# Patient Record
Sex: FEMALE | Race: WHITE | Employment: FULL TIME | URBAN - METROPOLITAN AREA
[De-identification: names, ages, dates, MRNs, and addresses within clinical notes are randomized per-mention and may not be internally consistent; named-entity substitution may affect disease eponyms.]

---

## 2019-02-14 LAB
ALBUMIN SERPL-MCNC: NORMAL G/DL
ALP BLD-CCNC: NORMAL U/L
ALT SERPL-CCNC: NORMAL U/L
ANION GAP SERPL CALCULATED.3IONS-SCNC: NORMAL MMOL/L
AST SERPL-CCNC: NORMAL U/L
BILIRUB SERPL-MCNC: NORMAL MG/DL
BUN BLDV-MCNC: NORMAL MG/DL
CALCIUM SERPL-MCNC: NORMAL MG/DL
CHLORIDE BLD-SCNC: NORMAL MMOL/L
CHOLESTEROL, TOTAL: NORMAL
CHOLESTEROL/HDL RATIO: NORMAL
CO2: NORMAL
CREAT SERPL-MCNC: NORMAL MG/DL
GFR CALCULATED: NORMAL
GLUCOSE BLD-MCNC: NORMAL MG/DL
HDLC SERPL-MCNC: NORMAL MG/DL
LDL CHOLESTEROL CALCULATED: NORMAL
POTASSIUM SERPL-SCNC: NORMAL MMOL/L
SODIUM BLD-SCNC: NORMAL MMOL/L
TOTAL PROTEIN: NORMAL
TRIGL SERPL-MCNC: NORMAL MG/DL
TSH SERPL DL<=0.05 MIU/L-ACNC: NORMAL M[IU]/L
VLDLC SERPL CALC-MCNC: NORMAL MG/DL

## 2019-07-08 DIAGNOSIS — Z12.11 SCREENING FOR MALIGNANT NEOPLASM OF COLON: Primary | ICD-10-CM

## 2019-07-08 LAB
CONTROL: NORMAL
HEMOCCULT STL QL: NORMAL

## 2019-07-08 PROCEDURE — 82274 ASSAY TEST FOR BLOOD FECAL: CPT | Performed by: FAMILY MEDICINE

## 2019-07-17 ENCOUNTER — OFFICE VISIT (OUTPATIENT)
Dept: PRIMARY CARE CLINIC | Age: 39
End: 2019-07-17
Payer: COMMERCIAL

## 2019-07-17 VITALS
DIASTOLIC BLOOD PRESSURE: 60 MMHG | HEART RATE: 67 BPM | HEIGHT: 62 IN | BODY MASS INDEX: 28.16 KG/M2 | WEIGHT: 153 LBS | OXYGEN SATURATION: 97 % | SYSTOLIC BLOOD PRESSURE: 122 MMHG

## 2019-07-17 DIAGNOSIS — Z00.00 HEALTH MAINTENANCE EXAMINATION: ICD-10-CM

## 2019-07-17 DIAGNOSIS — F41.9 ANXIETY: Primary | ICD-10-CM

## 2019-07-17 PROCEDURE — 99213 OFFICE O/P EST LOW 20 MIN: CPT | Performed by: FAMILY MEDICINE

## 2019-07-17 RX ORDER — SERTRALINE HYDROCHLORIDE 25 MG/1
TABLET, FILM COATED ORAL
Qty: 30 TABLET | Refills: 12 | Status: SHIPPED
Start: 2019-07-17 | End: 2020-04-06 | Stop reason: SDUPTHER

## 2019-07-17 RX ORDER — SERTRALINE HYDROCHLORIDE 25 MG/1
TABLET, FILM COATED ORAL
Refills: 3 | COMMUNITY
Start: 2019-07-03 | End: 2019-07-17 | Stop reason: SDUPTHER

## 2019-07-17 NOTE — ASSESSMENT & PLAN NOTE
Health maintenance issues discussed at length 3/19-encouraged yearly physicals and she is going to schedule up March 2020 for physical and yearly thereafter, sooner as needed.   Blood work before if insurance covers

## 2019-07-17 NOTE — PROGRESS NOTES
19  Coral Backer : 1980 Sex: female  Age: 34 y.o. Chief Complaint   Patient presents with    Other       HPI  HPI:    Patient presents today for follow-up on the Zoloft. Feeling very well. Tolerating it well. Less OCD and anxiety. 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 Review of Systems  ROS:  Const: Denies chills, fever, malaise and sweats. Eyes: Denies discharge, pain, redness and visual disturbance. ENMT: Denies earaches, other ear symptoms. Denies nasal or sinus symptoms other than stated  above. Denies mouth and tongue lesions and sore throat. CV: Denies chest discomfort, pain; diaphoresis, dizziness, edema, lightheadedness, orthopnea,  palpitations, syncope and near syncopal episode or any exertional symptoms  Resp: Denies cough, hemoptysis, pleuritic pain, SOB, sputum production and wheezing. GI: Denies abdominal pain, change in bowel habits, hematochezia, melena, nausea and vomiting. : Denies urinary symptoms including dysuria , urgency, frequency or hematuria. Musculo: Denies musculoskeletal symptoms. Skin: Denies bruising and rash. Neuro: Denies headache, numbness, stiff neck, tingling and focal weakness slurred speech or facial  droop  Hema/Lymph: Denies bleeding/bruising tendency and enlarged lymph nodes        Current Outpatient Medications:     sertraline (ZOLOFT) 25 MG tablet, TAKE ONE TABLET BY MOUTH EVERY DAY, Disp: 30 tablet, Rfl: 12  No Known Allergies    No past medical history on file. No past surgical history on file. No family history on file. Social History     Tobacco Use    Smoking status: Not on file   Substance Use Topics    Alcohol use: Not on file    Drug use: Not on file      Social History     Social History Narrative    PMH:    (Health Maintenance)    Medical Problems:    Denies    Surgical Hx:    Denies    Reviewed, no changes. FH:    Father:    . (Hx)    Mother:    .

## 2019-08-16 PROBLEM — Z00.00 HEALTH MAINTENANCE EXAMINATION: Status: RESOLVED | Noted: 2019-07-17 | Resolved: 2019-08-16

## 2020-03-11 LAB
ALBUMIN SERPL-MCNC: 4.6 G/DL
ALP BLD-CCNC: 75 U/L
ALT SERPL-CCNC: 13 U/L
ANION GAP SERPL CALCULATED.3IONS-SCNC: 2.2 MMOL/L
AST SERPL-CCNC: 12 U/L
BASOPHILS ABSOLUTE: 59 /ΜL
BASOPHILS RELATIVE PERCENT: 1 %
BILIRUB SERPL-MCNC: 0.5 MG/DL (ref 0.1–1.4)
BILIRUBIN, URINE: NEGATIVE
BLOOD, URINE: NEGATIVE
BUN BLDV-MCNC: 19 MG/DL
CALCIUM SERPL-MCNC: 9.4 MG/DL
CHLORIDE BLD-SCNC: 104 MMOL/L
CHOLESTEROL, TOTAL: 200 MG/DL
CHOLESTEROL/HDL RATIO: 5.1
CLARITY: ABNORMAL
CO2: 28 MMOL/L
COLOR: YELLOW
CREAT SERPL-MCNC: 0.96 MG/DL
EOSINOPHILS ABSOLUTE: 242 /ΜL
EOSINOPHILS RELATIVE PERCENT: 4.1 %
GFR CALCULATED: 73
GLUCOSE BLD-MCNC: 98 MG/DL
GLUCOSE URINE: ABNORMAL
HCT VFR BLD CALC: 42 % (ref 36–46)
HDLC SERPL-MCNC: 39 MG/DL (ref 35–70)
HEMOGLOBIN: 14 G/DL (ref 12–16)
KETONES, URINE: NEGATIVE
LDL CHOLESTEROL CALCULATED: 136 MG/DL (ref 0–160)
LEUKOCYTE ESTERASE, URINE: ABNORMAL
LYMPHOCYTES ABSOLUTE: 1540 /ΜL
LYMPHOCYTES RELATIVE PERCENT: 26.1 %
MCH RBC QN AUTO: 28.5 PG
MCHC RBC AUTO-ENTMCNC: 33.3 G/DL
MCV RBC AUTO: 85.4 FL
MONOCYTES ABSOLUTE: 419 /ΜL
MONOCYTES RELATIVE PERCENT: 7.1 %
NEUTROPHILS ABSOLUTE: 3640 /ΜL
NEUTROPHILS RELATIVE PERCENT: 61.7 %
NITRITE, URINE: NEGATIVE
PDW BLD-RTO: 13.1 %
PH UA: 5 (ref 4.5–8)
PLATELET # BLD: 214 K/ΜL
PMV BLD AUTO: 11.7 FL
POTASSIUM SERPL-SCNC: 4.2 MMOL/L
PROTEIN UA: NEGATIVE
RBC # BLD: 4.92 10^6/ΜL
SODIUM BLD-SCNC: 138 MMOL/L
SPECIFIC GRAVITY, URINE: 1.03
TOTAL PROTEIN: 6.7
TRIGL SERPL-MCNC: 129 MG/DL
TSH SERPL DL<=0.05 MIU/L-ACNC: 1.12 UIU/ML
UROBILINOGEN, URINE: ABNORMAL
VLDLC SERPL CALC-MCNC: NORMAL MG/DL
WBC # BLD: 5.9 10^3/ML

## 2020-04-06 RX ORDER — SERTRALINE HYDROCHLORIDE 25 MG/1
TABLET, FILM COATED ORAL
Qty: 90 TABLET | Refills: 0 | Status: SHIPPED
Start: 2020-04-06 | End: 2020-06-15 | Stop reason: SDUPTHER

## 2020-06-15 ENCOUNTER — OFFICE VISIT (OUTPATIENT)
Dept: PRIMARY CARE CLINIC | Age: 40
End: 2020-06-15
Payer: COMMERCIAL

## 2020-06-15 VITALS
SYSTOLIC BLOOD PRESSURE: 106 MMHG | OXYGEN SATURATION: 99 % | BODY MASS INDEX: 29.81 KG/M2 | DIASTOLIC BLOOD PRESSURE: 70 MMHG | TEMPERATURE: 98.6 F | RESPIRATION RATE: 18 BRPM | HEART RATE: 120 BPM | WEIGHT: 163 LBS

## 2020-06-15 PROBLEM — E78.2 MIXED HYPERLIPIDEMIA: Status: ACTIVE | Noted: 2020-06-15

## 2020-06-15 PROCEDURE — 93000 ELECTROCARDIOGRAM COMPLETE: CPT | Performed by: FAMILY MEDICINE

## 2020-06-15 PROCEDURE — 99213 OFFICE O/P EST LOW 20 MIN: CPT | Performed by: FAMILY MEDICINE

## 2020-06-15 PROCEDURE — 99396 PREV VISIT EST AGE 40-64: CPT | Performed by: FAMILY MEDICINE

## 2020-06-15 ASSESSMENT — PATIENT HEALTH QUESTIONNAIRE - PHQ9
SUM OF ALL RESPONSES TO PHQ QUESTIONS 1-9: 0
SUM OF ALL RESPONSES TO PHQ QUESTIONS 1-9: 0
2. FEELING DOWN, DEPRESSED OR HOPELESS: 0
1. LITTLE INTEREST OR PLEASURE IN DOING THINGS: 0
SUM OF ALL RESPONSES TO PHQ9 QUESTIONS 1 & 2: 0

## 2020-06-15 NOTE — PROGRESS NOTES
6/15/20  [facility] : 1980 Sex: female  Age: 37 y.o. Chief Complaint   Patient presents with    Annual Exam       HPI:   Patient presents today for physical and also has some increased anxiety over Covid-19 pandemic, would like her Zoloft increased.   Absolutely no SI/HI    Most Recent Labs  CBC  Lab Results   Component Value Date    WBC 5.9 03/10/2020    WBC 6.8 2013    WBC 6.4 2011    RBC 4.92 03/10/2020    RBC 4.72 2013    RBC 4.29 2011    HGB 14.0 03/10/2020    HGB 13.6 2013    HGB 12.6 2011    HCT 42.0 03/10/2020    HCT 40.8 2013    HCT 36.9 2011    MCV 85.4 03/10/2020    MCV 86.6 2013    MCV 86.0 2011     03/10/2020     2013     2011      CMP  Lab Results   Component Value Date     03/10/2020     2013     2011    K 4.2 03/10/2020    K 4.4 2013    K 4.2 2011     03/10/2020     2013     2011    CO2 28 03/10/2020    CO2 31 2013    CO2 30 2011    ANIONGAP 2.2 03/10/2020    GLUCOSE 98 03/10/2020    GLUCOSE 82 2013    GLUCOSE 84 2011    BUN 19 03/10/2020    BUN 14 2013    BUN 13 2011    CREATININE 0.96 03/10/2020    CREATININE 0.8 2013    CREATININE 0.8 2011    LABGLOM 73 03/10/2020    LABGLOM >60 2013    LABGLOM >60 2011    CALCIUM 9.4 03/10/2020    CALCIUM 9.2 2013    CALCIUM 9.1 2011    PROT 6.5 2013    PROT 6.9 2011    LABALBU 4.6 03/10/2020    LABALBU 4.3 2013    LABALBU 4.3 2011    BILITOT 0.5 03/10/2020    BILITOT 0.3 2013    BILITOT 0.5 2011    ALKPHOS 75 03/10/2020    ALKPHOS 69 2013    ALKPHOS 122 2011    AST 12 03/10/2020    AST 18 2013    AST 17 2011    ALT 13 03/10/2020    ALT 14 2013    ALT 18 2011     A1C  No results found for: LABA1C  TSH  Lab Results   Component Value Date    TSH hematuria. Musculo: Denies musculoskeletal symptoms. Skin: Denies bruising and rash. Neuro: Denies headache, numbness, stiff neck, tingling and focal weakness slurred speech or facial  droop  Hema/Lymph: Denies bleeding/bruising tendency and enlarged lymph nodes. Health Maintenance:  Proper diet reviewed including Mediterranean and DASH diets. Counseled on healthy weight, appropriate exercise, avoidance of tobacco, and recommendations for minimal to no alcohol consumption. Counseled on the potential pros and cons of vitamins and supplements. Reviewed the recommendations and risk/benefits of vaccines including Td/Tdap (declines),  pneumovax,  prevnar 13 , flu vaccine, Hepatitis vaccines, gardasil (delines), and shingrix (patient had chicken pox). HIV and Hep C (declines)  screening guidelines were reviewed. Importance of regular eye and dental exams and health reviewed. Risks/Benefits of ASA reviewed and discussed latest guidelines. Sun protection reviewed. Notify if any new or changing moles/skin lesions, etc. Dexa Scan indications/ risk factors for osteoporotic fractures and prevention reviewed. Colonoscopy recommendations reviewed. Pt denies change in bowel habits or [address] of colon polyps/CA. Fit test offered. EKG done and reviewed with pt. Discussed the indications for additional  cardiac testing including referrals, stress testing,  2d echo, ect. Not interested and ASX. Reviewed indications for other testing such as  PFT's.and  indications for imaging including brain, carotid, chest, abdominal, aortic . Pt is ASX and not interested at this time. Counseled on instructions regarding, and importance of monthly breast exams, yearly physician exams (sooner if sx's). Indications for mammograms reviewed and importance. Dexa Scan indications/ risk factors for osteoporotic fractures (and associated M/M) and preventative measures reviewed.  Importance of regular GYN exams reviewed and pt to follow

## 2020-07-06 ENCOUNTER — VIRTUAL VISIT (OUTPATIENT)
Dept: PRIMARY CARE CLINIC | Age: 40
End: 2020-07-06
Payer: COMMERCIAL

## 2020-07-06 PROCEDURE — 99213 OFFICE O/P EST LOW 20 MIN: CPT | Performed by: FAMILY MEDICINE

## 2020-07-06 NOTE — PROGRESS NOTES
sinus symptoms other than stated  above. Denies mouth and tongue lesions and sore throat. CV: Denies chest discomfort, pain; diaphoresis, dizziness, edema, lightheadedness, orthopnea,  palpitations, syncope and near syncopal episode or any exertional symptoms  Resp: Denies cough, hemoptysis, pleuritic pain, SOB, sputum production and wheezing. GI: Denies abdominal pain, change in bowel habits, hematochezia, melena, nausea and vomiting. : Denies urinary symptoms including dysuria , urgency, frequency or hematuria. Musculo: Denies musculoskeletal symptoms. Skin: Denies bruising and rash. Neuro: Denies headache, numbness, stiff neck, tingling and focal weakness slurred speech or facial  droop  Hema/Lymph: Denies bleeding/bruising tendency and enlarged lymph nodes         Current Outpatient Medications:     sertraline (ZOLOFT) 50 MG tablet, Take 1 tablet by mouth daily, Disp: 90 tablet, Rfl: 3  No Known Allergies    No past medical history on file. No past surgical history on file. Family History   Problem Relation Age of Onset    Coronary Art Dis Father 54    Heart Surgery Father 54    Cancer Father         lung    Colon Polyps Father         early 68's    Breast Cancer Maternal Aunt     Breast Cancer Maternal Grandmother      Social History     Tobacco Use    Smoking status: Never Smoker    Smokeless tobacco: Never Used   Substance Use Topics    Alcohol use: Not on file    Drug use: Not on file     Social History     Social History Narrative    PMH:    (Health Maintenance)    Medical Problems:    Denies    Surgical Hx:    Denies    Reviewed, no changes. FH:    Father:    . (Hx)    Mother:    . (Hx)    Brother 1:    . (Hx)    Sister 1:    . (Hx)    Dad - CAD/CABG 54, Lung CA (smoker), Colon polyp early 66's    Mom - healthy    Maternal grandmother and maternal aunt breast ca (pt doesn't know if braca tested)    Reviewed, no changes.         SH:    . 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 . (Alcohol). (Drug Use)                 PHYSICAL EXAMINATION:  [ INSTRUCTIONS:  \"[x]\" Indicates a positive item  \"[]\" Indicates a negative item  -- DELETE ALL ITEMS NOT EXAMINED]  Vital Signs: (As obtained by patient/caregiver or practitioner observation)    There were no vitals filed for this visit. Blood pressure-  Heart rate-    Respiratory rate-    Temperature-  Pulse oximetry-     Constitutional: [x] Appears well-developed and well-nourished [x] No apparent distress      [] Abnormal-   Mental status  [x] Alert and awake  [x] Oriented to person/place/time [x]Able to follow commands      Eyes:  EOM    [x]  Normal  [] Abnormal-  Sclera  [x]  Normal  [] Abnormal -         Discharge [x]  None visible  [] Abnormal -    HENT:   [x] Normocephalic, atraumatic. [] Abnormal   [x] Mouth/Throat: Mucous membranes are moist.     External Ears [x] Normal  [] Abnormal-     Neck: [x] No visualized mass     Pulmonary/Chest: [x] Respiratory effort normal.  [x] No visualized signs of difficulty breathing or respiratory distress        [] Abnormal-      Musculoskeletal:   [x] Normal gait with no signs of ataxia         [x] Normal range of motion of neck        [] Abnormal-       Neurological:        [x] No Facial Asymmetry (Cranial nerve 7 motor function) (limited exam to video visit)          [x] No gaze palsy        [] Abnormal-         Skin:        [x] No significant exanthematous lesions or discoloration noted on facial skin         [] Abnormal-            Psychiatric:       [x] Normal Affect [x] No Hallucinations        [] Abnormal-     Other pertinent observable physical exam findings-     ASSESSMENT/PLAN:   Diagnosis Orders   1. Anxiety     2. Mixed hyperlipidemia     3.  Health maintenance examination         No problem-specific Assessment & Plan notes found for this encounter. Anxiety  Counseled. Tolerating Zoloft and doing better on the 50 mg dosing, increased mid June 2020. Continue current dose and simply wants to follow-up in 1 year for physical sooner as needed. Certainly call if any issues. Absolutely no SI/HI. Appropriate counseling recommended. Risk benefits ADRs reviewed     Annual physical exam  Health maintenance issues discussed at length 6/2020 Encouraged yearly physicals.        Mixed hyperlipidemia  Counseled. Lifestyle modification emphasized. Weight watchers reviewed. Weight loss reviewed. Not interested in pharmacotherapy. Simply wants yearly check sooner as needed    Counseled extensively and differential diagnoses of above were reviewed, including serious etiologies. Side effects and interactions of medications were reviewed. Plan as above:       As long as symptoms steadily improve/resolve and medical conditions are following the expected course, FU as below, sooner PRN      Return keep physical, sooner prn. Time spent: Greater than Not billed by time      Saad Diehl is a 37 y.o. female being evaluated by a Virtual Visit (video visit) encounter to address concerns as mentioned above. A caregiver was present when appropriate. Due to this being a TeleHealth encounter (During [facility]27 public health emergency), evaluation of the following organ systems was limited: Vitals/Constitutional/EENT/Resp/CV/GI//MS/Neuro/Skin/Heme-Lymph-Imm. Pursuant to the emergency declaration under the [address] [place] [address], [address] [place] [address] authority and the [employer] and [employer] General Act, this Virtual Visit was conducted with patient's (and/or legal guardian's) consent, to reduce the patient's risk of exposure to COVID-19 and provide necessary medical care.   The patient (and/or legal guardian) has also been advised to contact this office for worsening conditions or problems, and seek emergency medical treatment and/or call 911 if deemed necessary. Services were provided through a video synchronous discussion virtually to substitute for in-person clinic visit. Patients are advised to check with insurance company to ensure coverage and to fully understand benefits and cost prior to any testing to try to avoid unexpected charges. This note was created with the assistance of voice recognition software. Inadvertent errors may be present. Signs and symptoms to watch for were discussed. Serious signs and symptoms reviewed. ER if any    --Vito Flannery MD on 7/6/2020 at 9:32 AM    An electronic signature was used to authenticate this note.

## 2020-07-15 PROBLEM — Z00.00 ANNUAL PHYSICAL EXAM: Status: RESOLVED | Noted: 2019-07-17 | Resolved: 2020-07-15

## 2021-05-17 DIAGNOSIS — F41.9 ANXIETY: ICD-10-CM

## 2021-06-04 DIAGNOSIS — Z00.00 ANNUAL PHYSICAL EXAM: ICD-10-CM

## 2021-06-04 LAB
ALBUMIN SERPL-MCNC: 4.3 G/DL
ALP BLD-CCNC: 70 U/L
ALT SERPL-CCNC: 11 U/L
ANION GAP SERPL CALCULATED.3IONS-SCNC: NORMAL MMOL/L
AST SERPL-CCNC: 13 U/L
BASOPHILS ABSOLUTE: 79 /ΜL
BASOPHILS RELATIVE PERCENT: 1.2 %
BILIRUB SERPL-MCNC: 0.4 MG/DL (ref 0.1–1.4)
BILIRUBIN, URINE: NEGATIVE
BLOOD, URINE: NEGATIVE
BUN BLDV-MCNC: 14 MG/DL
CALCIUM SERPL-MCNC: 9.1 MG/DL
CHLORIDE BLD-SCNC: 102 MMOL/L
CHOLESTEROL, TOTAL: 176 MG/DL
CHOLESTEROL/HDL RATIO: 4.6
CLARITY: ABNORMAL
CO2: 26 MMOL/L
COLOR: YELLOW
CREAT SERPL-MCNC: 0.85 MG/DL
EOSINOPHILS ABSOLUTE: 323 /ΜL
EOSINOPHILS RELATIVE PERCENT: 4.9 %
GFR CALCULATED: 85
GLUCOSE BLD-MCNC: 91 MG/DL
GLUCOSE URINE: NEGATIVE
HCT VFR BLD CALC: 41.2 % (ref 36–46)
HDLC SERPL-MCNC: 38 MG/DL (ref 35–70)
HEMOGLOBIN: 13.9 G/DL (ref 12–16)
KETONES, URINE: NEGATIVE
LDL CHOLESTEROL CALCULATED: 115 MG/DL (ref 0–160)
LEUKOCYTE ESTERASE, URINE: NEGATIVE
LYMPHOCYTES ABSOLUTE: 140 /ΜL
LYMPHOCYTES RELATIVE PERCENT: 29.4 %
MCH RBC QN AUTO: 29.2 PG
MCHC RBC AUTO-ENTMCNC: 33.7 G/DL
MCV RBC AUTO: 86.6 FL
MONOCYTES ABSOLUTE: 422 /ΜL
MONOCYTES RELATIVE PERCENT: 6.4 %
NEUTROPHILS ABSOLUTE: 3835 /ΜL
NEUTROPHILS RELATIVE PERCENT: 58.1 %
NITRITE, URINE: NEGATIVE
NONHDLC SERPL-MCNC: 138 MG/DL
PDW BLD-RTO: 12.3 %
PH UA: 5 (ref 4.5–8)
PLATELET # BLD: 220 K/ΜL
PMV BLD AUTO: 11.6 FL
POTASSIUM SERPL-SCNC: 3.9 MMOL/L
PROTEIN UA: NEGATIVE
RBC # BLD: 4.76 10^6/ΜL
SODIUM BLD-SCNC: 137 MMOL/L
SPECIFIC GRAVITY, URINE: 1.02
TOTAL PROTEIN: 6.3
TRIGL SERPL-MCNC: 119 MG/DL
TSH SERPL DL<=0.05 MIU/L-ACNC: 0.74 UIU/ML
UROBILINOGEN, URINE: ABNORMAL
VLDLC SERPL CALC-MCNC: NORMAL MG/DL
WBC # BLD: 6.6 10^3/ML

## 2021-06-16 ENCOUNTER — OFFICE VISIT (OUTPATIENT)
Dept: PRIMARY CARE CLINIC | Age: 41
End: 2021-06-16
Payer: COMMERCIAL

## 2021-06-16 VITALS
BODY MASS INDEX: 28.89 KG/M2 | TEMPERATURE: 98.3 F | RESPIRATION RATE: 16 BRPM | OXYGEN SATURATION: 98 % | HEART RATE: 78 BPM | WEIGHT: 157 LBS | HEIGHT: 62 IN | SYSTOLIC BLOOD PRESSURE: 118 MMHG | DIASTOLIC BLOOD PRESSURE: 72 MMHG

## 2021-06-16 DIAGNOSIS — Z83.71 FAMILY HISTORY OF COLONIC POLYPS: ICD-10-CM

## 2021-06-16 DIAGNOSIS — Z00.00 HEALTH MAINTENANCE EXAMINATION: Primary | ICD-10-CM

## 2021-06-16 DIAGNOSIS — F41.9 ANXIETY: ICD-10-CM

## 2021-06-16 PROBLEM — Z83.719 FAMILY HISTORY OF COLONIC POLYPS: Status: ACTIVE | Noted: 2021-06-16

## 2021-06-16 PROBLEM — Z80.0 FAMILY HISTORY OF COLON CANCER: Status: ACTIVE | Noted: 2021-06-16

## 2021-06-16 PROCEDURE — 99396 PREV VISIT EST AGE 40-64: CPT | Performed by: FAMILY MEDICINE

## 2021-06-16 ASSESSMENT — PATIENT HEALTH QUESTIONNAIRE - PHQ9
1. LITTLE INTEREST OR PLEASURE IN DOING THINGS: 0
2. FEELING DOWN, DEPRESSED OR HOPELESS: 0
SUM OF ALL RESPONSES TO PHQ QUESTIONS 1-9: 0
SUM OF ALL RESPONSES TO PHQ9 QUESTIONS 1 & 2: 0

## 2021-06-16 NOTE — PROGRESS NOTES
LABGLOM 73 03/10/2020    LABGLOM >60 11/13/2013    CALCIUM 9.1 06/03/2021    CALCIUM 9.4 03/10/2020    CALCIUM 9.2 11/13/2013    PROT 6.5 11/13/2013    PROT 6.9 11/28/2011    LABALBU 4.3 06/03/2021    LABALBU 4.6 03/10/2020    LABALBU 4.3 11/13/2013    LABALBU 4.3 11/28/2011    BILITOT 0.4 06/03/2021    BILITOT 0.5 03/10/2020    BILITOT 0.3 11/13/2013    ALKPHOS 70 06/03/2021    ALKPHOS 75 03/10/2020    ALKPHOS 69 11/13/2013    AST 13 06/03/2021    AST 12 03/10/2020    AST 18 11/13/2013    ALT 11 06/03/2021    ALT 13 03/10/2020    ALT 14 11/13/2013     A1C  No results found for: LABA1C  TSH  Lab Results   Component Value Date    TSH 0.74 06/03/2021    TSH 1.12 03/10/2020    TSH 1.038 11/13/2013     FREET4  No results found for: R4PNTYQ  LIPID  Lab Results   Component Value Date    CHOL 176 06/03/2021    CHOL 200 03/10/2020    CHOL 140 11/13/2013    CHOL 177 11/28/2011    HDL 38 06/03/2021    HDL 39 03/10/2020    HDL 50.0 11/13/2013    LDLCALC 115 06/03/2021    LDLCALC 136 03/10/2020    LDLCALC 79 11/13/2013    TRIG 119 06/03/2021    TRIG 129 03/10/2020    TRIG 56 11/13/2013    CHOLHDLRATIO 4.6 06/03/2021    CHOLHDLRATIO 5.1 03/10/2020     VITAMIN D  Lab Results   Component Value Date    VITD25 26 11/28/2011     MAGNESIUM  No results found for: MG   PHOS  No results found for: PHOS   DEDRICK   No results found for: DEDRICK  RHEUMATOID FACTOR  No results found for: RF  PSA  No results found for: PSA   HEPATITIS C  No results found for: HCVABI  HIV  No results found for: FWN8SSU, HIV1QT  UA  Lab Results   Component Value Date    COLORU Yellow 06/03/2021    COLORU Yellow 03/10/2020    CLARITYU Cloudy 06/03/2021    CLARITYU Cloudy 03/10/2020    GLUCOSEU Negative 06/03/2021    GLUCOSEU neg 03/10/2020    BILIRUBINUR Negative 06/03/2021    BILIRUBINUR Negative 03/10/2020    KETUA Negative 06/03/2021    KETUA Negative 03/10/2020    BLOODU Negative 06/03/2021    BLOODU Negative 03/10/2020    PHUR 5.0 06/03/2021    PHUR 5.0 03/10/2020 PROTEINU Negative 06/03/2021    PROTEINU Negative 03/10/2020    NITRU Negative 06/03/2021    NITRU Negative 03/10/2020    LEUKOCYTESUR Negative 06/03/2021    LEUKOCYTESUR 1+ 03/10/2020     Urine Micro/Albumin Ratio  No results found for: MALBCR              Review of Systems  ROS:  Const: Denies chills, fever, malaise and sweats. Eyes: Denies discharge, pain, redness and visual disturbance. ENMT: Denies earaches, other ear symptoms. Denies nasal or sinus symptoms other than stated  above. Denies mouth and tongue lesions and sore throat. CV: Denies chest discomfort, pain; diaphoresis, dizziness, edema, lightheadedness, orthopnea,  palpitations, syncope and near syncopal episode or any exertional symptoms  Resp: Denies cough, hemoptysis, pleuritic pain, SOB, sputum production and wheezing. GI: Denies abdominal pain, change in bowel habits, hematochezia, melena, nausea and vomiting. : Denies urinary symptoms including dysuria , urgency, frequency or hematuria. Musculo: Denies musculoskeletal symptoms. Skin: Denies bruising and rash. Neuro: Denies headache, numbness, stiff neck, tingling and focal weakness slurred speech or facial  droop  Hema/Lymph: Denies bleeding/bruising tendency and enlarged lymph nodes. Health Maintenance:  Proper diet reviewed including Mediterranean and DASH diets. Counseled on healthy weight, appropriate exercise, avoidance of tobacco, and recommendations for minimal to no alcohol consumption. Counseled on the potential pros and cons of vitamins and supplements. Reviewed the recommendations and risk/benefits of vaccines including COVID vaccine,  Td/Tdap (declines),  pneumovax,  prevnar 13 , flu vaccine, Hepatitis vaccines, gardasil (delines), and shingrix (patient had chicken pox). HIV and Hep C (declines)  screening guidelines were reviewed. Importance of regular eye and dental exams and health reviewed. Risks/Benefits of ASA reviewed and discussed latest guidelines.  Rudy Baer protection reviewed. Notify if any new or changing moles/skin lesions, etc. Dexa Scan indications/ risk factors for osteoporotic fractures and prevention reviewed. Colonoscopy recommendations reviewed. Pt denies change in bowel habits or [address] of colon CA. Dad had polyps, his dr recommended kids be screened after 36. Fit test provided. Otherwise defers till 37     Past EKG  reviewed with pt. Discussed the indications for repeat ekg and for  additional  cardiac testing including referrals, stress testing,  2d echo, ect. dasx Reviewed indications for other testing such as  PFT's.and  indications for imaging including brain, carotid, chest, abdominal, aortic .  dasx    Counseled on instructions regarding, and importance of monthly breast exams, yearly physician exams (sooner if sx's). Indications for mammograms reviewed and importance. Dexa Scan indications/ risk factors for osteoporotic fractures (and associated M/M) and preventative measures reviewed. Importance of regular GYN exams reviewed and pt to follow here or with her gynecologist as directed. Gets mammo through gyn in dr [place] [facility]uff        Current Outpatient Medications:     sertraline (ZOLOFT) 50 MG tablet, Take 1 tablet by mouth daily, Disp: 90 tablet, Rfl: 3  No Known Allergies    No past medical history on file. No past surgical history on file.   Family History   Problem Relation Age of Onset    Coronary Art Dis Father 54    Heart Surgery Father 54    Cancer Father         lung    Colon Polyps Father         early 68's    Breast Cancer Maternal Aunt     Breast Cancer Maternal Grandmother      Social History     Tobacco Use    Smoking status: Never Smoker    Smokeless tobacco: Never Used   Vaping Use    Vaping Use: Never used   Substance Use Topics    Alcohol use: Not on file    Drug use: Not on file      Social History     Social History Narrative    PMH:    (Health Maintenance)    Medical Problems:    Denies    Surgical Hx: Denies    Reviewed, no changes. FH:    Father:    . (Hx)    Mother:    . (Hx)    Brother 1:    . (Hx)    Sister 1:    . (Hx)    Dad - CAD/CABG 54, Lung CA (smoker), Colon polyp early 66's    Mom - healthy    Maternal grandmother and maternal aunt breast ca (pt doesn't know if braca tested)    Reviewed, no changes. SH:    . ([address]    , 2 kids (ages 8 girl and 7 boy as of 2019 shared custody)    [employer] shoots    Personal Habits: Cigarette Use: Nonsmoker. . (Alcohol). (Drug Use)        Vitals:    06/16/21 0929   BP: 118/72   Pulse: 78   Resp: 16   Temp: 98.3 °F (36.8 °C)   SpO2: 98%   Weight: 157 lb (71.2 kg)   Height: 5' 2\" (1.575 m)       Physical Exam  Exam:  Const: Appears comfortable. No signs of acute distress present. Head/Face: Atraumatic on inspection. Eyes: EOMI in both eyes. No discharge from the eyes. PERRL. Sclerae clear. ENMT: Auditory canals normal. Tympanic membranes: intact and translucent. External nose WNL. Nasal mucosa is clear. Oropharynx: No erythema or exudate. Posterior pharynx is normal.  Neck: Supple. Palpation reveals no adenopathy. No masses appreciated. No JVD. Carotids: no  bruits. Resp: Respirations are unlabored. Clear to auscultation. No rales, rhonchi or wheezes appreciated  over the lungs bilaterally. CV: Rate is regular. Rhythm is regular. No gallop or rubs. No heart murmur appreciated. Extremities: No clubbing, cyanosis, or edema. No calf inflammation or tenderness. Abdomen: Bowel sounds are normoactive. Abdomen is soft, nontender, and nondistended. No  abdominal masses. No palpable hepatosplenomegaly. Lymph: No palpable or visible regional lymphadenopathy. Musculoskeletal: no acute joint inflammation. Skin: Dry and warm with no rash. Skin normal to inspection and palpation overall. Neuro: Alert and oriented. Affect: appropriate. Upper Extremities: 5/5 bilaterally. Lower Extremities:  5/5 bilaterally.  Sensation intact to light touch. Reflexes: DTR's are symmetric and 2+ bilaterally. .  Cranial Nerves: Cranial nerves grossly intact. Assessment and Plan:    Diagnosis Orders   1. Health maintenance examination  Lipid Panel    TSH without Reflex    Comprehensive Metabolic Panel    CBC Auto Differential    Urinalysis   2. Anxiety  sertraline (ZOLOFT) 50 MG tablet   3. Family history of colonic polyps  POCT Fecal Immunochemical Test (FIT)        Anxiety  Counseled. Tolerating Zoloft , working well. Would like rf. Absolutely no SI/HI. Appropriate counseling recommended. Risk benefits ADRs reviewed    Annual physical exam  Health maintenance issues discussed at length as above, 6/21. Encouraged yearly physicals. Mixed hyperlipidemia  Counseled. Lifestyle modification reviewed. Improved. Role of pharmacotherapy reviewed. Simply wants yearly check sooner as needed     Family history of colonic polyps  Dad's doctor recommended kids getting screened starting age 36, pt defers to 37 but willing to have fit test.          Plan as above. Counseled extensively and differential diagnoses relevant to above were reviewed, including serious etiologies. Side effects and interactions of medications were reviewed. Counseled. Fit test. Rf. bw and fu 1yr, sooner prn. As long as symptoms steadily improve/resolve, and medical conditions follow the expected course, FU as below, sooner PRN. Return in about 1 year (around 6/16/2022) for physical.        Signs and symptoms to watch for discussed, serious signs and symptoms reviewed. ER if any. Daisy Crocker MD    Patients are advised to check with insurance company to ensure coverage and to fully understand benefits and cost prior to any testing. This note was created with the assistance of voice recognition software. Document was reviewed however may contain grammatical errors.

## 2021-06-16 NOTE — ASSESSMENT & PLAN NOTE
Dad's doctor recommended kids getting screened starting age 36, pt defers to 37 but willing to have fit test.

## 2021-06-30 DIAGNOSIS — Z83.71 FAMILY HISTORY OF COLONIC POLYPS: ICD-10-CM

## 2021-06-30 LAB
CONTROL: NORMAL
HEMOCCULT STL QL: NEGATIVE

## 2021-06-30 PROCEDURE — 82274 ASSAY TEST FOR BLOOD FECAL: CPT | Performed by: FAMILY MEDICINE

## 2022-06-13 LAB
ALBUMIN SERPL-MCNC: 4.4 G/DL
ALP BLD-CCNC: NORMAL U/L
ALT SERPL-CCNC: 11 U/L
ANION GAP SERPL CALCULATED.3IONS-SCNC: NORMAL MMOL/L
AST SERPL-CCNC: 10 U/L
BASOPHILS ABSOLUTE: 81 /ΜL
BASOPHILS RELATIVE PERCENT: 1.4 %
BILIRUB SERPL-MCNC: 0.9 MG/DL (ref 0.1–1.4)
BILIRUBIN, URINE: NEGATIVE
BLOOD, URINE: NEGATIVE
BUN BLDV-MCNC: 19 MG/DL
CALCIUM SERPL-MCNC: 9.1 MG/DL
CHLORIDE BLD-SCNC: 103 MMOL/L
CHOLESTEROL, TOTAL: 196 MG/DL
CHOLESTEROL/HDL RATIO: 4.1
CLARITY: ABNORMAL
CO2: 26 MMOL/L
COLOR: YELLOW
CREAT SERPL-MCNC: 0.88 MG/DL
EOSINOPHILS ABSOLUTE: 220 /ΜL
EOSINOPHILS RELATIVE PERCENT: 3.8 %
GFR CALCULATED: NORMAL
GLUCOSE BLD-MCNC: 87 MG/DL
GLUCOSE URINE: NEGATIVE
HCT VFR BLD CALC: 39.4 % (ref 36–46)
HDLC SERPL-MCNC: 48 MG/DL (ref 35–70)
HEMOGLOBIN: 13.5 G/DL (ref 12–16)
KETONES, URINE: NEGATIVE
LDL CHOLESTEROL CALCULATED: 123 MG/DL (ref 0–160)
LEUKOCYTE ESTERASE, URINE: ABNORMAL
LYMPHOCYTES ABSOLUTE: 1473 /ΜL
LYMPHOCYTES RELATIVE PERCENT: 25.4 %
MCH RBC QN AUTO: 29.9 PG
MCHC RBC AUTO-ENTMCNC: 34.9 G/DL
MCV RBC AUTO: 87.4 FL
MONOCYTES ABSOLUTE: 412 /ΜL
MONOCYTES RELATIVE PERCENT: 7.1 %
NEUTROPHILS ABSOLUTE: 3613 /ΜL
NEUTROPHILS RELATIVE PERCENT: 62.3 %
NITRITE, URINE: NEGATIVE
NONHDLC SERPL-MCNC: 148 MG/DL
PDW BLD-RTO: 12.9 %
PH UA: 5 (ref 4.5–8)
PLATELET # BLD: 194 K/ΜL
PMV BLD AUTO: 11 FL
POTASSIUM SERPL-SCNC: 4 MMOL/L
PROTEIN UA: NEGATIVE
RBC # BLD: 4.51 10^6/ΜL
SODIUM BLD-SCNC: 136 MMOL/L
SPECIFIC GRAVITY, URINE: 1.02
TOTAL PROTEIN: 6.5
TRIGL SERPL-MCNC: 140 MG/DL
TSH SERPL DL<=0.05 MIU/L-ACNC: 1.37 UIU/ML
UROBILINOGEN, URINE: NORMAL
VLDLC SERPL CALC-MCNC: NORMAL MG/DL
WBC # BLD: 5.8 10^3/ML

## 2022-06-14 DIAGNOSIS — Z00.00 HEALTH MAINTENANCE EXAMINATION: ICD-10-CM

## 2022-06-16 ENCOUNTER — OFFICE VISIT (OUTPATIENT)
Dept: PRIMARY CARE CLINIC | Age: 42
End: 2022-06-16
Payer: COMMERCIAL

## 2022-06-16 VITALS
HEIGHT: 62 IN | HEART RATE: 81 BPM | SYSTOLIC BLOOD PRESSURE: 122 MMHG | BODY MASS INDEX: 29.44 KG/M2 | OXYGEN SATURATION: 98 % | TEMPERATURE: 98 F | DIASTOLIC BLOOD PRESSURE: 76 MMHG | WEIGHT: 160 LBS

## 2022-06-16 DIAGNOSIS — Z83.71 FAMILY HISTORY OF COLONIC POLYPS: ICD-10-CM

## 2022-06-16 DIAGNOSIS — E66.3 OVERWEIGHT: ICD-10-CM

## 2022-06-16 DIAGNOSIS — Z00.00 ANNUAL PHYSICAL EXAM: Primary | ICD-10-CM

## 2022-06-16 DIAGNOSIS — F41.9 ANXIETY: ICD-10-CM

## 2022-06-16 PROCEDURE — 90471 IMMUNIZATION ADMIN: CPT | Performed by: FAMILY MEDICINE

## 2022-06-16 PROCEDURE — 99396 PREV VISIT EST AGE 40-64: CPT | Performed by: FAMILY MEDICINE

## 2022-06-16 PROCEDURE — 90715 TDAP VACCINE 7 YRS/> IM: CPT | Performed by: FAMILY MEDICINE

## 2022-06-16 PROCEDURE — 93000 ELECTROCARDIOGRAM COMPLETE: CPT | Performed by: FAMILY MEDICINE

## 2022-06-16 ASSESSMENT — PATIENT HEALTH QUESTIONNAIRE - PHQ9
SUM OF ALL RESPONSES TO PHQ QUESTIONS 1-9: 0
1. LITTLE INTEREST OR PLEASURE IN DOING THINGS: 0
2. FEELING DOWN, DEPRESSED OR HOPELESS: 0
SUM OF ALL RESPONSES TO PHQ9 QUESTIONS 1 & 2: 0
SUM OF ALL RESPONSES TO PHQ QUESTIONS 1-9: 0

## 2022-06-16 NOTE — PROGRESS NOTES
Yu Merida : 1980 Sex: female  Age: 35 y.o. Chief Complaint   Patient presents with    Annual Exam       HPI:   Overall feeling very well. Dad with CAD and colon polyps. His dr recommended kids be screened starting age 36, she is asx, would like fit test. Defers scope now or otherwise. Defers till 37.           The 10-year ASCVD risk score (Jaelyn Daigle, et al., 2013) is: 0.8%    Values used to calculate the score:      Age: 35 years      Sex: Female      Is Non- : No      Diabetic: No      Tobacco smoker: No      Systolic Blood Pressure: 224 mmHg      Is BP treated: No      HDL Cholesterol: 48 mg/dL      Total Cholesterol: 196 mg/dL      BW reviewed, HDL 48,       Most Recent Labs  CBC  Lab Results   Component Value Date    WBC 5.8 2022    WBC 6.6 2021    WBC 5.9 03/10/2020    RBC 4.51 2022    RBC 4.76 2021    RBC 4.92 03/10/2020    HGB 13.5 2022    HGB 13.9 2021    HGB 14.0 03/10/2020    HCT 39.4 2022    HCT 41.2 2021    HCT 42.0 03/10/2020    MCV 87.4 2022    MCV 86.6 2021    MCV 85.4 03/10/2020     2022     2021     03/10/2020      CMP  Lab Results   Component Value Date     2022     2021     03/10/2020    K 4.0 2022    K 3.9 2021    K 4.2 03/10/2020     2022     2021     03/10/2020    CO2 26 2022    CO2 26 2021    CO2 28 03/10/2020    ANIONGAP 2.2 03/10/2020    GLUCOSE 87 2022    GLUCOSE 91 2021    GLUCOSE 98 03/10/2020    GLUCOSE 84 2011    BUN 19 2022    BUN 14 2021    BUN 19 03/10/2020    CREATININE 0.88 2022    CREATININE 0.85 2021    CREATININE 0.96 03/10/2020    LABGLOM 85 2021    LABGLOM 73 03/10/2020    LABGLOM >60 2013    CALCIUM 9.1 2022    CALCIUM 9.1 2021    CALCIUM 9.4 03/10/2020    PROT 6.5 2013 PROT 6.9 11/28/2011    LABALBU 4.4 06/13/2022    LABALBU 4.3 06/03/2021    LABALBU 4.6 03/10/2020    LABALBU 4.3 11/28/2011    BILITOT 0.9 06/13/2022    BILITOT 0.4 06/03/2021    BILITOT 0.5 03/10/2020    ALKPHOS 70 06/03/2021    ALKPHOS 75 03/10/2020    ALKPHOS 69 11/13/2013    AST 10 06/13/2022    AST 13 06/03/2021    AST 12 03/10/2020    ALT 11 06/13/2022    ALT 11 06/03/2021    ALT 13 03/10/2020     A1C  No results found for: LABA1C  TSH  Lab Results   Component Value Date    TSH 1.37 06/13/2022    TSH 0.74 06/03/2021    TSH 1.12 03/10/2020     FREET4  No results found for: C8VPCRC  LIPID  Lab Results   Component Value Date    CHOL 196 06/13/2022    CHOL 176 06/03/2021    CHOL 200 03/10/2020    HDL 48 06/13/2022    HDL 38 06/03/2021    HDL 39 03/10/2020    LDLCALC 123 06/13/2022    LDLCALC 115 06/03/2021    LDLCALC 136 03/10/2020    TRIG 140 06/13/2022    TRIG 119 06/03/2021    TRIG 129 03/10/2020    CHOLHDLRATIO 4.1 06/13/2022    CHOLHDLRATIO 4.6 06/03/2021    CHOLHDLRATIO 5.1 03/10/2020     VITAMIN D  Lab Results   Component Value Date    VITD25 26 11/28/2011     MAGNESIUM  No results found for: MG   PHOS  No results found for: PHOS   DEDRICK   No results found for: DEDRICK  RHEUMATOID FACTOR  No results found for: RF  PSA  No results found for: PSA   HEPATITIS C  No results found for: HCVABI  HIV  No results found for: WNG1HDI, HIV1QT  UA  Lab Results   Component Value Date    COLORU Yellow 06/13/2022    COLORU Yellow 06/03/2021    COLORU Yellow 03/10/2020    CLARITYU Cloudy 06/13/2022    CLARITYU Cloudy 06/03/2021    CLARITYU Cloudy 03/10/2020    GLUCOSEU negative 06/13/2022    GLUCOSEU Negative 06/03/2021    GLUCOSEU neg 03/10/2020    BILIRUBINUR Negative 06/13/2022    BILIRUBINUR Negative 06/03/2021    BILIRUBINUR Negative 03/10/2020    KETUA Negative 06/13/2022    KETUA Negative 06/03/2021    KETUA Negative 03/10/2020    BLOODU Negative 06/13/2022    BLOODU Negative 06/03/2021    BLOODU Negative 03/10/2020 PHUR 5.0 06/13/2022    PHUR 5.0 06/03/2021    PHUR 5.0 03/10/2020    PROTEINU Negative 06/13/2022    PROTEINU Negative 06/03/2021    PROTEINU Negative 03/10/2020    UROBILINOGEN Normal 06/13/2022    NITRU Negative 06/13/2022    NITRU Negative 06/03/2021    NITRU Negative 03/10/2020    LEUKOCYTESUR 1+ 06/13/2022    LEUKOCYTESUR Negative 06/03/2021    LEUKOCYTESUR 1+ 03/10/2020     Urine Micro/Albumin Ratio  No results found for: MALBCR              Review of Systems  ROS:  Const: Denies chills, fever, malaise and sweats. Eyes: Denies discharge, pain, redness and visual disturbance. ENMT: Denies earaches, other ear symptoms. Denies nasal or sinus symptoms other than stated  above. Denies mouth and tongue lesions and sore throat. CV: Denies chest discomfort, pain; diaphoresis, dizziness, edema, lightheadedness, orthopnea,  palpitations, syncope and near syncopal episode or any exertional symptoms  Resp: Denies cough, hemoptysis, pleuritic pain, SOB, sputum production and wheezing. GI: Denies abdominal pain, change in bowel habits, hematochezia, melena, nausea and vomiting. : Denies urinary symptoms including dysuria , urgency, frequency or hematuria. Musculo: Denies musculoskeletal symptoms. Skin: Denies bruising and rash. Neuro: Denies headache, numbness, stiff neck, tingling and focal weakness slurred speech or facial  droop  Hema/Lymph: Denies bleeding/bruising tendency and enlarged lymph nodes. Health Maintenance:  Proper diet reviewed including Mediterranean and DASH diets. Counseled on healthy weight, appropriate exercise, avoidance of tobacco, and recommendations for minimal to no alcohol consumption. Counseled on the potential pros and cons of vitamins and supplements. Reviewed the recommendations and risk/benefits of vaccines including COVID vaccine, declines, had covid 11/21.   Td/Tdap (agrees),  pneumovax,  prevnar 13 , flu vaccine, Hepatitis vaccines, gardasil (declines), and shingrix (patient had chicken pox). HIV and Hep C (declines)  screening guidelines were reviewed. Importance of regular eye and dental exams and health reviewed. Risks/Benefits of ASA reviewed and discussed latest guidelines. Sun protection reviewed. Notify if any new or changing moles/skin lesions, etc. Dexa Scan indications/ risk factors for osteoporotic fractures and prevention reviewed. Colonoscopy recommendations reviewed. Pt denies change in bowel habits or [address] of colon CA. Dad had polyps, his dr recommended kids be screened after 36. Fit test provided. Otherwise defers till 43     EKG done and  reviewed with pt. Discussed the indications for repeat ekg and for  additional  cardiac testing including referrals, stress testing,  2d echo, ect. dasx Reviewed indications for other testing such as  PFT's.and  indications for imaging including brain, carotid, chest, abdominal, aortic .  dasx    Counseled on instructions regarding, and importance of monthly breast exams, yearly physician exams (sooner if sx's). Indications for mammograms reviewed and importance. Dexa Scan indications/ risk factors for osteoporotic fractures (and associated M/M) and preventative measures reviewed. Importance of regular GYN exams reviewed and pt to follow here or with her gynecologist as directed. Gets mammo through gyn in dr [place] [facility]      Current Outpatient Medications:     sertraline (ZOLOFT) 50 MG tablet, Take 1 tablet by mouth daily, Disp: 90 tablet, Rfl: 3  No Known Allergies    No past medical history on file. No past surgical history on file.   Family History   Problem Relation Age of Onset    Coronary Art Dis Father 54    Heart Surgery Father 54    Cancer Father         lung    Colon Polyps Father         early 68's    Breast Cancer Maternal Aunt     Breast Cancer Maternal Grandmother      Social History     Tobacco Use    Smoking status: Never Smoker    Smokeless tobacco: Never Used   Vaping Use    Vaping Use: Never used   Substance Use Topics    Alcohol use: Not on file    Drug use: Not on file      Social History     Social History Narrative    PMH:    (Health Maintenance)    Medical Problems:    Denies    Surgical Hx:    Denies    Reviewed, no changes. FH:    Father:    . (Hx)    Mother:    . (Hx)    Brother 1:    . (Hx)    Sister 1:    . (Hx)    Dad - CAD/CABG 54, Lung CA (smoker), Colon polyp early 66's    Mom - healthy    Maternal grandmother and maternal aunt breast ca (pt doesn't know if braca tested)    Reviewed, no changes. SH:    . ([address]    , 2 kids (ages 8 girl and 7 boy as of 2019 shared custody)    Boyfriend (carla [employer]) 1 hour [place], in town every other week    [employer] shoots    Personal Habits: Cigarette Use: Nonsmoker. . (Alcohol). (Drug Use)        Vitals:    06/16/22 0906   BP: 122/76   Pulse: 81   Temp: 98 °F (36.7 °C)   SpO2: 98%   Weight: 160 lb (72.6 kg)   Height: 5' 2\" (1.575 m)       Physical Exam  Exam:  Const: Appears comfortable. No signs of acute distress present. Head/Face: Atraumatic on inspection. Eyes: EOMI in both eyes. No discharge from the eyes. PERRL. Sclerae clear. ENMT: Auditory canals normal. Tympanic membranes: intact and translucent. External nose WNL. Nasal mucosa is clear. Oropharynx: No erythema or exudate. Posterior pharynx is normal.  Neck: Supple. Palpation reveals no adenopathy. No masses appreciated. No JVD. Carotids: no  bruits. Resp: Respirations are unlabored. Clear to auscultation. No rales, rhonchi or wheezes appreciated  over the lungs bilaterally. CV: Rate is regular. Rhythm is regular. No gallop or rubs. No heart murmur appreciated. Extremities: No clubbing, cyanosis, or edema. No calf inflammation or tenderness. Abdomen: Bowel sounds are normoactive. Abdomen is soft, nontender, and nondistended. No  abdominal masses. No palpable hepatosplenomegaly.   Lymph: No palpable or visible regional lymphadenopathy. Musculoskeletal: no acute joint inflammation. Skin: Dry and warm with no rash. Skin normal to inspection and palpation overall. Neuro: Alert and oriented. Affect: appropriate. Upper Extremities: 5/5 bilaterally. Lower Extremities:  5/5 bilaterally. Sensation intact to light touch. Reflexes: DTR's are symmetric and 2+ bilaterally. .  Cranial Nerves: Cranial nerves grossly intact. Assessment and Plan:    Diagnosis Orders   1. Annual physical exam  EKG 12 lead    EKG 12 lead    Tdap, ADACEL, (age 10y-63y), IM    Lipid Panel    TSH    Comprehensive Metabolic Panel    CBC with Auto Differential    Urinalysis   2. Anxiety  sertraline (ZOLOFT) 50 MG tablet   3. Family history of colonic polyps  POCT Fecal Immunochemical Test (FIT)   4. Overweight          Anxiety  Counseled. Tolerating Zoloft , working well. Would like rf. Absolutely no SI/HI. Appropriate counseling recommended. Risk benefits ADRs reviewed    Annual physical exam  Health maintenance issues discussed at length as above, 6/16/22. Encouraged yearly physicals. Mixed hyperlipidemia  Counseled. Lifestyle modification reviewed. LDL up but HDL up   Role of pharmacotherapy reviewed. Defers Simply wants yearly check sooner as needed     Family history of colonic polyps  Dad's doctor recommended kids getting screened starting age 36, pt defers to 37 but willing to have fit test.      Overweight  Counseled, approp diet/exercise reviewed, small frequent meals, wt watchers. Counseled on dieticians. Counseled on wegovy. Plan as above. Counseled extensively and differential diagnoses relevant to above were reviewed, including serious etiologies, risks and complications, especially of left uncontrolled. If relevant, instructions and  alternatives to meds/treatment reviewed, as well as interactions, and  SE's/ADRs reviewed, notify immediately if any, discontinuing new meds if any.   Plan made after discussion and shared decision

## 2022-10-03 LAB — MAMMOGRAPHY, EXTERNAL: NORMAL

## 2023-06-19 ENCOUNTER — OFFICE VISIT (OUTPATIENT)
Dept: PRIMARY CARE CLINIC | Age: 43
End: 2023-06-19
Payer: COMMERCIAL

## 2023-06-19 VITALS
SYSTOLIC BLOOD PRESSURE: 122 MMHG | WEIGHT: 173 LBS | OXYGEN SATURATION: 97 % | TEMPERATURE: 97.3 F | DIASTOLIC BLOOD PRESSURE: 60 MMHG | HEART RATE: 84 BPM | BODY MASS INDEX: 31.64 KG/M2

## 2023-06-19 DIAGNOSIS — F41.9 ANXIETY: ICD-10-CM

## 2023-06-19 DIAGNOSIS — Z00.00 ANNUAL PHYSICAL EXAM: Primary | ICD-10-CM

## 2023-06-19 DIAGNOSIS — Z12.11 COLON CANCER SCREENING: ICD-10-CM

## 2023-06-19 DIAGNOSIS — Z83.71 FAMILY HISTORY OF COLONIC POLYPS: ICD-10-CM

## 2023-06-19 DIAGNOSIS — E66.9 OBESITY (BMI 30-39.9): ICD-10-CM

## 2023-06-19 PROCEDURE — 99396 PREV VISIT EST AGE 40-64: CPT | Performed by: FAMILY MEDICINE

## 2023-06-19 PROCEDURE — 93000 ELECTROCARDIOGRAM COMPLETE: CPT | Performed by: FAMILY MEDICINE

## 2023-06-19 SDOH — ECONOMIC STABILITY: FOOD INSECURITY: WITHIN THE PAST 12 MONTHS, THE FOOD YOU BOUGHT JUST DIDN'T LAST AND YOU DIDN'T HAVE MONEY TO GET MORE.: NEVER TRUE

## 2023-06-19 SDOH — ECONOMIC STABILITY: FOOD INSECURITY: WITHIN THE PAST 12 MONTHS, YOU WORRIED THAT YOUR FOOD WOULD RUN OUT BEFORE YOU GOT MONEY TO BUY MORE.: NEVER TRUE

## 2023-06-19 SDOH — ECONOMIC STABILITY: HOUSING INSECURITY
IN THE LAST 12 MONTHS, WAS THERE A TIME WHEN YOU DID NOT HAVE A STEADY PLACE TO SLEEP OR SLEPT IN A SHELTER (INCLUDING NOW)?: NO

## 2023-06-19 SDOH — ECONOMIC STABILITY: INCOME INSECURITY: HOW HARD IS IT FOR YOU TO PAY FOR THE VERY BASICS LIKE FOOD, HOUSING, MEDICAL CARE, AND HEATING?: NOT HARD AT ALL

## 2023-06-19 NOTE — PROGRESS NOTES
Vani Goddard : 1980 Sex: female  Age: 38 y.o. Chief Complaint   Patient presents with    Annual Exam    Health Maintenance    Discuss Labs       HPI:   Overall feeling very well.            The 10-year ASCVD risk score (Cielo ROSE, et al., 2019) is: 0.9%    Values used to calculate the score:      Age: 38 years      Sex: Female      Is Non- : No      Diabetic: No      Tobacco smoker: No      Systolic Blood Pressure: 960 mmHg      Is BP treated: No      HDL Cholesterol: 41 mg/dL      Total Cholesterol: 172 mg/dL      BW reviewed, protein 6.0 CMP otherwise normal HDL 41  triglyceride 112 TSH normal CBC with differential normal    Most Recent Labs  CBC  Lab Results   Component Value Date/Time    WBC 6.1 2023 12:00 AM    WBC 5.8 2022 12:00 AM    WBC 6.6 2021 12:00 AM    RBC 4.41 2023 12:00 AM    RBC 4.51 2022 12:00 AM    RBC 4.76 2021 12:00 AM    HGB 12.9 2023 12:00 AM    HGB 13.5 2022 12:00 AM    HGB 13.9 2021 12:00 AM    HCT 37.7 2023 12:00 AM    HCT 39.4 2022 12:00 AM    HCT 41.2 2021 12:00 AM    MCV 85.5 2023 12:00 AM    MCV 87.4 2022 12:00 AM    MCV 86.6 2021 12:00 AM     2023 12:00 AM     2022 12:00 AM     2021 12:00 AM      CMP  Lab Results   Component Value Date/Time     2023 12:00 AM     2022 12:00 AM     2021 12:00 AM    K 4.2 2023 12:00 AM    K 4.0 2022 12:00 AM    K 3.9 2021 12:00 AM     2023 12:00 AM     2022 12:00 AM     2021 12:00 AM    CO2 24 2023 12:00 AM    CO2 26 2022 12:00 AM    CO2 26 2021 12:00 AM    ANIONGAP 2.2 03/10/2020 12:00 AM    GLUCOSE 96 2023 12:00 AM    GLUCOSE 87 2022 12:00 AM    GLUCOSE 91 2021 12:00 AM    GLUCOSE 84 2011 01:50 PM    BUN 16 2023 12:00 AM    BUN 19 2022 12:00

## 2023-06-26 DIAGNOSIS — F41.9 ANXIETY: ICD-10-CM

## 2023-07-03 DIAGNOSIS — Z12.11 COLON CANCER SCREENING: ICD-10-CM

## 2023-07-03 LAB
CONTROL: NORMAL
HEMOCCULT STL QL: NEGATIVE

## 2023-07-03 PROCEDURE — 82274 ASSAY TEST FOR BLOOD FECAL: CPT | Performed by: FAMILY MEDICINE

## 2023-11-15 LAB — MAMMOGRAPHY, EXTERNAL: NORMAL

## 2024-01-04 DIAGNOSIS — F41.9 ANXIETY: ICD-10-CM

## 2024-06-14 LAB
ALBUMIN SERPL-MCNC: 4.3 G/DL
ALP BLD-CCNC: 80 U/L
ALT SERPL-CCNC: 13 U/L
ANION GAP SERPL CALCULATED.3IONS-SCNC: ABNORMAL MMOL/L
AST SERPL-CCNC: 11 U/L
BASOPHILS ABSOLUTE: 69 /ΜL
BASOPHILS RELATIVE PERCENT: 0.9 %
BILIRUB SERPL-MCNC: 0.4 MG/DL (ref 0.1–1.4)
BILIRUBIN, URINE: NEGATIVE
BLOOD, URINE: NEGATIVE
BUN BLDV-MCNC: 16 MG/DL
CALCIUM SERPL-MCNC: 9.4 MG/DL
CHLORIDE BLD-SCNC: 103 MMOL/L
CHOLESTEROL, TOTAL: 219 MG/DL
CHOLESTEROL/HDL RATIO: 5.1
CLARITY: ABNORMAL
CO2: 25 MMOL/L
COLOR: YELLOW
CREAT SERPL-MCNC: 0.83 MG/DL
EGFR: 89
EOSINOPHILS ABSOLUTE: 300 /ΜL
EOSINOPHILS RELATIVE PERCENT: 3.9 %
GLUCOSE BLD-MCNC: 103 MG/DL
GLUCOSE URINE: NEGATIVE
HCT VFR BLD CALC: 41.7 % (ref 36–46)
HDLC SERPL-MCNC: 43 MG/DL (ref 35–70)
HEMOGLOBIN: 14 G/DL (ref 12–16)
KETONES, URINE: NEGATIVE
LDL CHOLESTEROL CALCULATED: 140 MG/DL (ref 0–160)
LEUKOCYTE ESTERASE, URINE: NEGATIVE
LYMPHOCYTES ABSOLUTE: 1725 /ΜL
LYMPHOCYTES RELATIVE PERCENT: 22.4 %
MCH RBC QN AUTO: 28.8 PG
MCHC RBC AUTO-ENTMCNC: 33.6 G/DL
MCV RBC AUTO: 85.8 FL
MONOCYTES ABSOLUTE: 462 /ΜL
MONOCYTES RELATIVE PERCENT: 6 %
NEUTROPHILS ABSOLUTE: 5144 /ΜL
NEUTROPHILS RELATIVE PERCENT: 66.8 %
NITRITE, URINE: NEGATIVE
NONHDLC SERPL-MCNC: 176 MG/DL
PDW BLD-RTO: 12.6 %
PH UA: 5.5 (ref 4.5–8)
PLATELET # BLD: 274 K/ΜL
PMV BLD AUTO: 11.4 FL
POTASSIUM SERPL-SCNC: 4.2 MMOL/L
PROTEIN UA: NEGATIVE
RBC # BLD: 4.86 10^6/ΜL
SODIUM BLD-SCNC: 138 MMOL/L
SPECIFIC GRAVITY, URINE: 1.02
TOTAL PROTEIN: 6.7
TRIGL SERPL-MCNC: 216 MG/DL
TSH SERPL DL<=0.05 MIU/L-ACNC: 1.1 UIU/ML
UROBILINOGEN, URINE: NORMAL
VLDLC SERPL CALC-MCNC: ABNORMAL MG/DL
WBC # BLD: 7.7 10^3/ML

## 2024-06-21 ASSESSMENT — PATIENT HEALTH QUESTIONNAIRE - PHQ9
SUM OF ALL RESPONSES TO PHQ QUESTIONS 1-9: 0
SUM OF ALL RESPONSES TO PHQ9 QUESTIONS 1 & 2: 0
SUM OF ALL RESPONSES TO PHQ QUESTIONS 1-9: 0
1. LITTLE INTEREST OR PLEASURE IN DOING THINGS: NOT AT ALL
2. FEELING DOWN, DEPRESSED OR HOPELESS: NOT AT ALL
2. FEELING DOWN, DEPRESSED OR HOPELESS: NOT AT ALL
SUM OF ALL RESPONSES TO PHQ9 QUESTIONS 1 & 2: 0
SUM OF ALL RESPONSES TO PHQ QUESTIONS 1-9: 0
SUM OF ALL RESPONSES TO PHQ QUESTIONS 1-9: 0
1. LITTLE INTEREST OR PLEASURE IN DOING THINGS: NOT AT ALL

## 2024-06-24 ENCOUNTER — OFFICE VISIT (OUTPATIENT)
Dept: PRIMARY CARE CLINIC | Age: 44
End: 2024-06-24
Payer: COMMERCIAL

## 2024-06-24 VITALS
WEIGHT: 186 LBS | HEART RATE: 95 BPM | SYSTOLIC BLOOD PRESSURE: 126 MMHG | BODY MASS INDEX: 34.23 KG/M2 | DIASTOLIC BLOOD PRESSURE: 68 MMHG | OXYGEN SATURATION: 96 % | TEMPERATURE: 98.1 F | HEIGHT: 62 IN

## 2024-06-24 DIAGNOSIS — Z12.11 COLON CANCER SCREENING: ICD-10-CM

## 2024-06-24 DIAGNOSIS — R73.9 HYPERGLYCEMIA: ICD-10-CM

## 2024-06-24 DIAGNOSIS — F41.9 ANXIETY: ICD-10-CM

## 2024-06-24 DIAGNOSIS — Z83.719 FAMILY HISTORY OF COLONIC POLYPS: ICD-10-CM

## 2024-06-24 DIAGNOSIS — E78.2 MIXED HYPERLIPIDEMIA: ICD-10-CM

## 2024-06-24 DIAGNOSIS — Z00.00 ANNUAL PHYSICAL EXAM: ICD-10-CM

## 2024-06-24 DIAGNOSIS — Z00.00 ANNUAL PHYSICAL EXAM: Primary | ICD-10-CM

## 2024-06-24 PROBLEM — Z80.0 FAMILY HISTORY OF COLON CANCER: Status: RESOLVED | Noted: 2021-06-16 | Resolved: 2024-06-24

## 2024-06-24 PROCEDURE — 93000 ELECTROCARDIOGRAM COMPLETE: CPT | Performed by: FAMILY MEDICINE

## 2024-06-24 PROCEDURE — 99396 PREV VISIT EST AGE 40-64: CPT | Performed by: FAMILY MEDICINE

## 2024-06-24 PROCEDURE — 99214 OFFICE O/P EST MOD 30 MIN: CPT | Performed by: FAMILY MEDICINE

## 2024-06-24 RX ORDER — METFORMIN HYDROCHLORIDE 500 MG/1
500 TABLET, EXTENDED RELEASE ORAL
Qty: 30 TABLET | Refills: 3 | Status: SHIPPED | OUTPATIENT
Start: 2024-06-24

## 2024-06-24 SDOH — ECONOMIC STABILITY: FOOD INSECURITY: WITHIN THE PAST 12 MONTHS, YOU WORRIED THAT YOUR FOOD WOULD RUN OUT BEFORE YOU GOT MONEY TO BUY MORE.: NEVER TRUE

## 2024-06-24 SDOH — ECONOMIC STABILITY: FOOD INSECURITY: WITHIN THE PAST 12 MONTHS, THE FOOD YOU BOUGHT JUST DIDN'T LAST AND YOU DIDN'T HAVE MONEY TO GET MORE.: NEVER TRUE

## 2024-06-24 SDOH — ECONOMIC STABILITY: INCOME INSECURITY: HOW HARD IS IT FOR YOU TO PAY FOR THE VERY BASICS LIKE FOOD, HOUSING, MEDICAL CARE, AND HEATING?: NOT HARD AT ALL

## 2024-06-24 NOTE — PROGRESS NOTES
(5' 2\")       Physical Exam  Exam:  Const: Appears comfortable. No signs of acute distress present.  Head/Face: Atraumatic on inspection.  Eyes: EOMI in both eyes. No discharge from the eyes. PERRL. Sclerae clear.  ENMT: Auditory canals normal. Tympanic membranes: intact and translucent. External nose WNL.  Nasal mucosa is clear. Oropharynx: No erythema or exudate. Posterior pharynx is normal.  Neck: Supple. Palpation reveals no adenopathy. No masses appreciated. No JVD. Carotids: no  bruits.  Resp: Respirations are unlabored. Clear to auscultation. No rales, rhonchi or wheezes appreciated  over the lungs bilaterally.  CV: Rate is regular. Rhythm is regular. No gallop or rubs. No heart murmur appreciated.  Extremities: No clubbing, cyanosis, or edema. No calf inflammation or tenderness.  Abdomen: Bowel sounds are normoactive. Abdomen is soft, nontender, and nondistended. No  abdominal masses. No palpable hepatosplenomegaly.  Lymph: No palpable or visible regional lymphadenopathy.  Musculoskeletal: no acute joint inflammation.  Skin: Dry and warm with no rash. Skin normal to inspection and palpation overall.  Neuro: Alert and oriented. Affect: appropriate. Upper Extremities: 5/5 bilaterally. Lower Extremities:  5/5 bilaterally. Sensation intact to light touch. Reflexes: DTR's are symmetric and 2+ bilaterally. .  Cranial Nerves: Cranial nerves grossly intact.      Assessment and Plan:    Diagnosis Orders   1. Annual physical exam  EKG 12 Lead      2. Hyperglycemia  Hemoglobin A1C    metFORMIN (GLUCOPHAGE-XR) 500 MG extended release tablet      3. Colon cancer screening  Ambulatory referral to General Surgery    POCT Fecal Immunochemical Test (FIT)      4. Mixed hyperlipidemia  Comprehensive Metabolic Panel    CK    Lipid Panel      5. Family history of colonic polyps        6. Anxiety             Anxiety  Counseled.  Tolerating Zoloft , working well.   Absolutely no SI/HI.  Appropriate counseling recommended.  Risk

## 2024-08-20 ENCOUNTER — OFFICE VISIT (OUTPATIENT)
Dept: SURGERY | Age: 44
End: 2024-08-20

## 2024-08-20 VITALS
HEART RATE: 71 BPM | DIASTOLIC BLOOD PRESSURE: 65 MMHG | SYSTOLIC BLOOD PRESSURE: 112 MMHG | BODY MASS INDEX: 32.6 KG/M2 | TEMPERATURE: 97.1 F | OXYGEN SATURATION: 98 % | WEIGHT: 184 LBS | HEIGHT: 63 IN

## 2024-08-20 DIAGNOSIS — Z12.11 ENCOUNTER FOR SCREENING COLONOSCOPY: Primary | ICD-10-CM

## 2024-08-20 NOTE — PROGRESS NOTES
(smoker), Colon polyp early 70's    Mom - healthy    Maternal grandmother and maternal aunt breast ca (pt doesn't know if braca tested)    Reviewed, no changes.        SH:    . ([employer]    , 2 kids (ages 10 girl and 7 boy as of 2019 shared custody)    Boyfriend (carla and nathen norris) 1 hour [place], in [facility] every other week    [employer] shoots    Personal Habits: Cigarette Use: Nonsmoker.. (Alcohol). (Drug Use)     Social Determinants of Health     Financial Resource Strain: Low Risk  (6/24/2024)    Overall Financial Resource Strain (CARDIA)     Difficulty of Paying Living Expenses: Not hard at all   Food Insecurity: No Food Insecurity (6/24/2024)    Hunger Vital Sign     Worried About Running Out of Food in the Last Year: Never true     Ran Out of Food in the Last Year: Never true   Transportation Needs: Unknown (6/24/2024)    PRAPARE - Transportation     Lack of Transportation (Non-Medical): No   Housing Stability: Unknown (6/24/2024)    Housing Stability Vital Sign     Unstable Housing in the Last Year: No       Family History   Problem Relation Age of Onset    Coronary Art Dis Father 55    Heart Surgery Father 55    Cancer Father         lung    Colon Polyps Father         early 70's    Breast Cancer Maternal Aunt     Breast Cancer Maternal Grandmother        Review of Systems   All other systems reviewed and are negative.              Objective:  Vitals:    08/20/24 1253   BP: 112/65   Pulse: 71   Temp: 97.1 °F (36.2 °C)   SpO2: 98%   Weight: 83.5 kg (184 lb)   Height: 1.588 m (5' 2.5\")        Body mass index is 33.12 kg/m².      Physical Exam  HENT:      Head: Normocephalic and atraumatic.   Eyes:      General:         Right eye: No discharge.         Left eye: No discharge.   Neck:      Trachea: No tracheal deviation.   Cardiovascular:      Rate and Rhythm: Normal rate.   Pulmonary:      Effort: Pulmonary effort is normal. No respiratory distress.   Abdominal:

## 2024-08-27 ENCOUNTER — PREP FOR PROCEDURE (OUTPATIENT)
Dept: SURGERY | Age: 44
End: 2024-08-27

## 2024-08-27 DIAGNOSIS — Z12.11 COLON CANCER SCREENING: ICD-10-CM

## 2024-09-05 ENCOUNTER — PATIENT MESSAGE (OUTPATIENT)
Dept: PRIMARY CARE CLINIC | Age: 44
End: 2024-09-05

## 2024-09-20 LAB
ALBUMIN: 4.2 G/DL
ALP BLD-CCNC: 72 U/L
ALT SERPL-CCNC: 11 U/L
ANION GAP SERPL CALCULATED.3IONS-SCNC: ABNORMAL MMOL/L
AST SERPL-CCNC: 13 U/L
BILIRUB SERPL-MCNC: 0.3 MG/DL (ref 0.1–1.4)
BUN BLDV-MCNC: 13 MG/DL
CALCIUM SERPL-MCNC: 8.8 MG/DL
CHLORIDE BLD-SCNC: 106 MMOL/L
CHOLESTEROL, TOTAL: 189 MG/DL
CHOLESTEROL/HDL RATIO: 5.3
CO2: 21 MMOL/L
CREAT SERPL-MCNC: 0.79 MG/DL
GFR, ESTIMATED: 94
GLUCOSE BLD-MCNC: 104 MG/DL
HDLC SERPL-MCNC: 36 MG/DL (ref 35–70)
LDL CHOLESTEROL: 124
NONHDLC SERPL-MCNC: 153 MG/DL
POTASSIUM SERPL-SCNC: 4.4 MMOL/L
SODIUM BLD-SCNC: 137 MMOL/L
TOTAL CK: 44 U/L
TOTAL PROTEIN: 6.2 G/DL (ref 6.4–8.2)
TRIGL SERPL-MCNC: 177 MG/DL
VLDLC SERPL CALC-MCNC: ABNORMAL MG/DL

## 2024-09-23 DIAGNOSIS — E78.2 MIXED HYPERLIPIDEMIA: ICD-10-CM

## 2024-09-23 DIAGNOSIS — R73.9 HYPERGLYCEMIA: ICD-10-CM

## 2024-09-26 PROBLEM — Z12.11 COLON CANCER SCREENING: Status: RESOLVED | Noted: 2024-08-27 | Resolved: 2024-09-26

## 2024-09-30 ENCOUNTER — OFFICE VISIT (OUTPATIENT)
Dept: PRIMARY CARE CLINIC | Age: 44
End: 2024-09-30
Payer: COMMERCIAL

## 2024-09-30 VITALS
DIASTOLIC BLOOD PRESSURE: 60 MMHG | WEIGHT: 185 LBS | HEART RATE: 98 BPM | OXYGEN SATURATION: 97 % | TEMPERATURE: 97.7 F | BODY MASS INDEX: 33.3 KG/M2 | SYSTOLIC BLOOD PRESSURE: 122 MMHG

## 2024-09-30 DIAGNOSIS — E66.9 OBESITY (BMI 30-39.9): ICD-10-CM

## 2024-09-30 DIAGNOSIS — F41.9 ANXIETY: ICD-10-CM

## 2024-09-30 DIAGNOSIS — Z00.00 ANNUAL PHYSICAL EXAM: ICD-10-CM

## 2024-09-30 DIAGNOSIS — R73.9 HYPERGLYCEMIA: Primary | ICD-10-CM

## 2024-09-30 DIAGNOSIS — E78.2 MIXED HYPERLIPIDEMIA: ICD-10-CM

## 2024-09-30 LAB — HBA1C MFR BLD: 5.4 %

## 2024-09-30 PROCEDURE — 99214 OFFICE O/P EST MOD 30 MIN: CPT | Performed by: FAMILY MEDICINE

## 2024-09-30 PROCEDURE — 83036 HEMOGLOBIN GLYCOSYLATED A1C: CPT | Performed by: FAMILY MEDICINE

## 2024-12-02 ENCOUNTER — TELEPHONE (OUTPATIENT)
Dept: SURGERY | Age: 44
End: 2024-12-02

## 2024-12-02 NOTE — TELEPHONE ENCOUNTER
Prior Authorization Form:      DEMOGRAPHICS:                     Patient Name:  Isamar Reno  Patient :  1980            Insurance:  Payor: [employer] [place] / Plan: [employer] PPO [place] LOCAL / Product Type: *No Product type* /   Insurance ID Number:    Payer/Plan Subscr  Sex Relation Sub. Ins. ID Effective Group Num   1. BCBS [employer]* ISAMAR RENO 1980 Female Self [id number]                                   [id number]         DIAGNOSIS & PROCEDURE:                       Procedure/Operation: Colonoscopy           CPT Code: 52624    Diagnosis:  Screening    ICD10 Code: Z12.11    Location:  [facility]    Surgeon:  Dr. Shahid    SCHEDULING INFORMATION:                          Date: 2025    Time: 08:00am              Anesthesia:  LMAC                                                       Status:  Outpatient        Special Comments:         Electronically signed by Parris Amin MA on 2024 at 3:21 PM

## 2024-12-02 NOTE — TELEPHONE ENCOUNTER
Leah Reno is scheduled for Colonoscopy with Dr Shahid on 02/06/2025 at [facility]. Patient needs to be NPO after midnight the night before procedure. All surgery instructions were explained to the patient and a surgery letter was also mailed out. MA informed patient that PAT will also be calling to review pre-op instructions and medications. Patient verbalized understanding.

## 2025-01-06 DIAGNOSIS — F41.9 ANXIETY: ICD-10-CM

## 2025-01-06 NOTE — TELEPHONE ENCOUNTER
Name of Medication(s) Requested:  Requested Prescriptions     Pending Prescriptions Disp Refills    sertraline (ZOLOFT) 50 MG tablet 90 tablet 3     Sig: Take 1 tablet by mouth daily       Medication is on current medication list Yes    Dosage and directions were verified? Yes    Quantity verified: 90 day supply     Pharmacy Verified?  Yes    Last Appointment:  9/30/2024    Future appts:  Future Appointments   Date Time Provider Department Center   2/25/2025  3:45 PM Lyle Shahid MD [facility]   7/9/2025  1:00 PM Bonilla Dimas MD [facility]        (If no appt send self scheduling link. .REFILLAPPT)  Scheduling request sent?     [] Yes  [] No    Does patient need updated?  [] Yes  [] No

## 2025-01-31 PROBLEM — Z12.11 COLON CANCER SCREENING: Status: ACTIVE | Noted: 2024-08-27

## 2025-01-31 NOTE — PROGRESS NOTES
[facility] PRE-ADMISSION TESTING INSTRUCTIONS    The Preadmission Testing patient is instructed accordingly using the following criteria (check applicable):    ARRIVAL INSTRUCTIONS:  [x] Parking the day of Surgery is located in the Main Entrance lot.  Upon entering the door, make an immediate right to the surgery reception desk    [x] Bring photo ID and insurance card    [] Bring in a copy of Living will or Durable Power of  papers.    [x] Please be sure to arrange for a responsible adult to provide transportation to and from the hospital    [x] Please arrange for a responsible adult to be with you for the 24 hour period post procedure due to having anesthesia    [x] If you awake am of surgery not feeling well or have temperature >100 please call [phone]    GENERAL INSTRUCTIONS:    [x] , may have up to 8oz of water until 4 hours prior to surgery. No gum, no candy, no mints. NPO time:       [x] You may brush your teeth, do not swallow any toothpaste    [x] Take medications as instructed     [x] Stop herbal supplements and vitamins 5 days prior to procedure    [x] Follow preop dosing of blood thinners per physician instructions    [] Take 1/2 dose of evening insulin, but no insulin after midnight    [] No oral diabetic medications after midnight    [] If diabetic and have low blood sugar or feel symptomatic, take 1-2oz apple juice only    [] Bring inhalers day of surgery    [x] Bring urine specimen day of surgery    [x] Shower or bath with soap, lather and rinse well, AM of Surgery, no lotion, powders or creams to surgical site    [x] Follow bowel prep as instructed per surgeon    [x] No tobacco products within 24 hours of surgery     [] No alcohol or illegal drug use, marijuana included, within 24 hours of surgery.    [x] Jewelry, body piercing's, eyeglasses, contact lenses and dentures are not permitted into surgery (bring cases)      [x] Please do not wear any nail polish,

## 2025-02-04 ENCOUNTER — TELEPHONE (OUTPATIENT)
Dept: SURGERY | Age: 45
End: 2025-02-04

## 2025-02-04 NOTE — TELEPHONE ENCOUNTER
Patient called and needed to reschedule colonoscopy, MA rescheduled to 05-09-25 at [facility].  Electronically signed by Alea Morrell MA on 2/4/2025 at 6:00 AM

## 2025-03-02 PROBLEM — Z12.11 COLON CANCER SCREENING: Status: RESOLVED | Noted: 2024-08-27 | Resolved: 2025-03-02

## 2025-05-05 ENCOUNTER — ANESTHESIA EVENT (OUTPATIENT)
Dept: ENDOSCOPY | Age: 45
End: 2025-05-05
Payer: COMMERCIAL

## 2025-05-05 ASSESSMENT — LIFESTYLE VARIABLES: SMOKING_STATUS: 0

## 2025-05-05 NOTE — ANESTHESIA PRE PROCEDURE
Department of Anesthesiology  Preprocedure Note       Name:  Leah Reno   Age:  44 y.o.  :  1980                                          MRN:  [id number]         Date:  2025      Surgeon: Surgeon(s):  Lyle Shahid MD    Procedure: Procedure(s):  COLORECTAL CANCER SCREENING, NOT HIGH RISK    Medications prior to admission:   Prior to Admission medications    Medication Sig Start Date End Date Taking? Authorizing Provider   sertraline (ZOLOFT) 50 MG tablet Take 1 tablet by mouth daily 25   Bonilla Dimas MD       Current medications:    No current facility-administered medications for this encounter.     Current Outpatient Medications   Medication Sig Dispense Refill    sertraline (ZOLOFT) 50 MG tablet Take 1 tablet by mouth daily 90 tablet 3       Allergies:  No Known Allergies    Problem List:    Patient Active Problem List   Diagnosis Code    Anxiety F41.9    Mixed hyperlipidemia E78.2    Family history of colonic polyps Z83.719       Past Medical History:  History reviewed. No pertinent past medical history.    Past Surgical History:  History reviewed. No pertinent surgical history.    Social History:    Social History     Tobacco Use    Smoking status: Never    Smokeless tobacco: Never   Substance Use Topics    Alcohol use: Yes     Alcohol/week: 2.0 standard drinks of alcohol     Types: 2 Drinks containing 0.5 oz of alcohol per week     Comment: social                                Counseling given: Not Answered      Vital Signs (Current):   Vitals:    25 0902   Weight: 83.9 kg (185 lb)   Height: 1.588 m (5' 2.5\")                                              BP Readings from Last 3 Encounters:   24 122/60   24 112/65   24 126/68       NPO Status:                                                                                 BMI:   Wt Readings from Last 3 Encounters:   24 83.9 kg (185 lb)   24 83.5 kg (184 lb)   24 84.4 kg (186 lb)     Body mass

## 2025-05-07 RX ORDER — ACETAMINOPHEN 500 MG
500 TABLET ORAL EVERY 6 HOURS PRN
COMMUNITY

## 2025-05-07 RX ORDER — IBUPROFEN 200 MG
400 TABLET ORAL EVERY 6 HOURS PRN
COMMUNITY

## 2025-05-07 NOTE — PROGRESS NOTES
[facility] PRE-ADMISSION TESTING INSTRUCTIONS    The Preadmission Testing patient is instructed accordingly using the following criteria (check applicable):    ARRIVAL INSTRUCTIONS:  [x] Parking the day of Surgery is located in the [facility] or  Entrance A lot.  Upon entering the door, make an immediate right to the surgery reception desk    [x] Bring photo ID and insurance card    [] Bring in a copy of Living will or Durable Power of  papers.    [x] Please be sure to arrange for responsible adult to provide transportation to and from the hospital    [x] Please arrange for responsible adult to be with you for the 24 hour period post procedure due to having anesthesia    [x] If you awake am of surgery not feeling well or have temperature >100 please call [phone]    GENERAL INSTRUCTIONS:    [x] May have clear liquids until 4 hours prior to surgery. Examples include water, fruit juices (no pulp), jello, popsicles, black coffee or tea, beef or chicken broth.1200/0800 MAY HAVE 8 OZ OF WATER               No gum, candy or mints.    [x] You may brush your teeth, but do not swallow any water    [x] Take medications as instructed with 1-2 oz of water. TYLENOL IF NEEDED.    [] Stop herbal supplements and vitamins 5 days prior to procedure    [x] Follow preop dosing of blood thinners per physician instructions    [] Take 1/2 dose of evening insulin, but no insulin after midnight    [] No oral diabetic medications after midnight    [] If diabetic and have low blood sugar or feel symptomatic, take 1-2oz apple juice only    [] Bring inhalers day of surgery    [] Bring C-PAP/ Bi-Pap day of surgery    [x] Bring urine specimen day of surgery. bE ABLE TO URINATE    [x] Shower or bath with soap, lather and rinse well, AM of Surgery, no lotion, powders or creams to surgical site    [x] Follow bowel prep as instructed per surgeon    [x] No tobacco products within 24 hours of surgery     [x] No

## 2025-05-09 ENCOUNTER — HOSPITAL ENCOUNTER (OUTPATIENT)
Age: 45
Setting detail: OUTPATIENT SURGERY
Discharge: HOME | End: 2025-05-09
Attending: SURGERY | Admitting: SURGERY
Payer: COMMERCIAL

## 2025-05-09 ENCOUNTER — ANESTHESIA (OUTPATIENT)
Dept: ENDOSCOPY | Age: 45
End: 2025-05-09
Payer: COMMERCIAL

## 2025-05-09 VITALS
TEMPERATURE: 97 F | DIASTOLIC BLOOD PRESSURE: 68 MMHG | WEIGHT: 185 LBS | HEIGHT: 62 IN | HEART RATE: 76 BPM | SYSTOLIC BLOOD PRESSURE: 116 MMHG | BODY MASS INDEX: 34.04 KG/M2 | OXYGEN SATURATION: 94 % | RESPIRATION RATE: 18 BRPM

## 2025-05-09 LAB
HCG, URINE, POC: NEGATIVE
Lab: NORMAL
NEGATIVE QC PASS/FAIL: NORMAL
POSITIVE QC PASS/FAIL: NORMAL

## 2025-05-09 PROCEDURE — 3700000001 HC ADD 15 MINUTES (ANESTHESIA): Performed by: SURGERY

## 2025-05-09 PROCEDURE — 2709999900 HC NON-CHARGEABLE SUPPLY: Performed by: SURGERY

## 2025-05-09 PROCEDURE — 3700000000 HC ANESTHESIA ATTENDED CARE: Performed by: SURGERY

## 2025-05-09 PROCEDURE — 3609027000 HC COLONOSCOPY: Performed by: SURGERY

## 2025-05-09 PROCEDURE — G0121 COLON CA SCRN NOT HI RSK IND: HCPCS | Performed by: SURGERY

## 2025-05-09 PROCEDURE — 7100000010 HC PHASE II RECOVERY - FIRST 15 MIN: Performed by: SURGERY

## 2025-05-09 PROCEDURE — 6360000002 HC RX W HCPCS: Performed by: NURSE ANESTHETIST, CERTIFIED REGISTERED

## 2025-05-09 PROCEDURE — 7100000011 HC PHASE II RECOVERY - ADDTL 15 MIN: Performed by: SURGERY

## 2025-05-09 RX ORDER — PROPOFOL 10 MG/ML
INJECTION, EMULSION INTRAVENOUS
Status: DISCONTINUED | OUTPATIENT
Start: 2025-05-09 | End: 2025-05-09 | Stop reason: SDUPTHER

## 2025-05-09 RX ADMIN — PROPOFOL 230 MG: 10 INJECTION, EMULSION INTRAVENOUS at 12:05

## 2025-05-09 ASSESSMENT — PAIN SCALES - GENERAL: PAINLEVEL_OUTOF10: 0

## 2025-05-09 ASSESSMENT — PAIN - FUNCTIONAL ASSESSMENT: PAIN_FUNCTIONAL_ASSESSMENT: NONE - DENIES PAIN

## 2025-05-09 NOTE — ANESTHESIA POSTPROCEDURE EVALUATION
Department of Anesthesiology  Postprocedure Note    Patient: Leah Reno  MRN: [id number]  YOB: 1980  Date of evaluation: 5/9/2025    Procedure Summary       Date: 05/09/25 Room / Location: [address]    Anesthesia Start: 1201 Anesthesia Stop: 1220    Procedure: COLORECTAL CANCER SCREENING, NOT HIGH RISK Diagnosis:       Colon cancer screening      (Colon cancer screening [Z12.11])    Surgeons: Lyle Shahid MD Responsible Provider: Frakn Henry DO    Anesthesia Type: MAC ASA Status: 2            Anesthesia Type: No value filed.    Kleber Phase I: Kleber Score: 10    Kleber Phase II: Kleber Score: 10    Anesthesia Post Evaluation    Level of consciousness: awake  Pain score: 1  Airway patency: patent  Nausea & Vomiting: no vomiting and no nausea  Cardiovascular status: hemodynamically stable  Respiratory status: acceptable  Hydration status: stable  Pain management: adequate    No notable events documented.

## 2025-05-09 NOTE — H&P
Denies     Reviewed, no changes.           FH:     Father:     . (Hx)     Mother:     . (Hx)     Brother 1:     . (Hx)     Sister 1:     . (Hx)     Dad - CAD/CABG 55, Lung CA (smoker), Colon polyp early 70's     Mom - healthy     Maternal grandmother and maternal aunt breast ca (pt doesn't know if braca tested)     Reviewed, no changes.           SH:     . (Marital)[employer]     , 2 kids (ages 10 girl and 7 boy as of 2019 shared custody)     Boyfriend (carla and nathen norris) [place], in [facility] every other week     [employer] shoots     Personal Habits: Cigarette Use: Nonsmoker.. (Alcohol). (Drug Use)      Social Determinants of Health           Financial Resource Strain: Low Risk  (6/24/2024)     Overall Financial Resource Strain (CARDIA)      Difficulty of Paying Living Expenses: Not hard at all   Food Insecurity: No Food Insecurity (6/24/2024)     Hunger Vital Sign      Worried About Running Out of Food in the Last Year: Never true      Ran Out of Food in the Last Year: Never true   Transportation Needs: Unknown (6/24/2024)     PRAPARE - Transportation      Lack of Transportation (Non-Medical): No   Housing Stability: Unknown (6/24/2024)     Housing Stability Vital Sign      Unstable Housing in the Last Year: No            Family History         Family History   Problem Relation Age of Onset    Coronary Art Dis Father 55    Heart Surgery Father 55    Cancer Father           lung    Colon Polyps Father           early 70's    Breast Cancer Maternal Aunt      Breast Cancer Maternal Grandmother              Review of Systems   All other systems reviewed and are negative.                  Objective:  Vitals       Vitals:     08/20/24 1253   BP: 112/65   Pulse: 71   Temp: 97.1 °F (36.2 °C)   SpO2: 98%   Weight: 83.5 kg (184 lb)   Height: 1.588 m (5' 2.5\")         Objective  Body mass index is 33.12 kg/m².        Physical Exam  HENT:      Head: Normocephalic and atraumatic.   Eyes:       discussed as above had risks, benefits, and reasonable alternatives thoroughly discussed with the patient or responsible party.     NOTE: This report, in part or full, may have been transcribed using voice recognition software. Every effort was made to ensure accuracy; however, inadvertent computerized transcription errors may be present. Please excuse any transcriptional grammatical or spelling errors that may have escaped my editorial review.     CC: Bonilla Dimas MD

## 2025-06-30 LAB
ALBUMIN: 4.4 G/DL
ALP BLD-CCNC: 88 U/L
ALT SERPL-CCNC: 12 U/L
ANION GAP SERPL CALCULATED.3IONS-SCNC: NORMAL MMOL/L
AST SERPL-CCNC: 12 U/L
BASOPHILS ABSOLUTE: 61 /ΜL
BASOPHILS RELATIVE PERCENT: 0.8 %
BILIRUB SERPL-MCNC: 0.6 MG/DL (ref 0.1–1.4)
BILIRUBIN, URINE: NEGATIVE
BLOOD, URINE: NEGATIVE
BUN BLDV-MCNC: 14 MG/DL
CALCIUM SERPL-MCNC: 9.4 MG/DL
CHLORIDE BLD-SCNC: 105 MMOL/L
CHOLESTEROL, TOTAL: 197 MG/DL
CHOLESTEROL/HDL RATIO: 5.3
CLARITY, UA: CLEAR
CO2: 25 MMOL/L
COLOR, UA: YELLOW
CREAT SERPL-MCNC: 0.85 MG/DL
EOSINOPHILS ABSOLUTE: 228 /ΜL
EOSINOPHILS RELATIVE PERCENT: 3 %
ESTIMATED AVERAGE GLUCOSE: NORMAL
GFR, ESTIMATED: 86
GLUCOSE BLD-MCNC: 99 MG/DL
GLUCOSE URINE: NEGATIVE
HBA1C MFR BLD: 5.6 %
HCT VFR BLD CALC: 42.8 % (ref 36–46)
HDLC SERPL-MCNC: 37 MG/DL (ref 35–70)
HEMOGLOBIN: 13.9 G/DL (ref 12–16)
KETONES, URINE: POSITIVE
LDL CHOLESTEROL: 132
LEUKOCYTE ESTERASE, URINE: NEGATIVE
LYMPHOCYTES ABSOLUTE: 1452 /ΜL
LYMPHOCYTES RELATIVE PERCENT: 19.1 %
MCH RBC QN AUTO: 28.8 PG
MCHC RBC AUTO-ENTMCNC: 32.5 G/DL
MCV RBC AUTO: 88.6 FL
MONOCYTES ABSOLUTE: 479 /ΜL
MONOCYTES RELATIVE PERCENT: 6.3 %
NEUTROPHILS ABSOLUTE: 5381 /ΜL
NEUTROPHILS RELATIVE PERCENT: 70.8 %
NITRITE, URINE: NEGATIVE
NONHDLC SERPL-MCNC: 160 MG/DL
PDW BLD-RTO: 13.1 %
PH UA: NORMAL
PLATELET # BLD: 243 K/ΜL
PMV BLD AUTO: 11 FL
POTASSIUM SERPL-SCNC: 4.3 MMOL/L
PROTEIN UA: NEGATIVE
RBC # BLD: 4.83 10^6/ΜL
SODIUM BLD-SCNC: 138 MMOL/L
SPECIFIC GRAVITY UA: 1.02 (ref 1–1.03)
TOTAL PROTEIN: 6.9 G/DL (ref 6.4–8.2)
TRIGL SERPL-MCNC: 151 MG/DL
TSH SERPL DL<=0.05 MIU/L-ACNC: 1.07 UIU/ML
UROBILINOGEN, URINE: NORMAL
VLDLC SERPL CALC-MCNC: ABNORMAL MG/DL
WBC # BLD: 7.6 10^3/ML

## 2025-07-03 ENCOUNTER — RESULTS FOLLOW-UP (OUTPATIENT)
Dept: PRIMARY CARE CLINIC | Age: 45
End: 2025-07-03

## 2025-07-03 DIAGNOSIS — F41.9 ANXIETY: ICD-10-CM

## 2025-07-03 DIAGNOSIS — R73.9 HYPERGLYCEMIA: ICD-10-CM

## 2025-07-03 DIAGNOSIS — E78.2 MIXED HYPERLIPIDEMIA: ICD-10-CM

## 2025-07-08 ASSESSMENT — PATIENT HEALTH QUESTIONNAIRE - PHQ9
2. FEELING DOWN, DEPRESSED OR HOPELESS: NOT AT ALL
SUM OF ALL RESPONSES TO PHQ QUESTIONS 1-9: 0
SUM OF ALL RESPONSES TO PHQ9 QUESTIONS 1 & 2: 0
1. LITTLE INTEREST OR PLEASURE IN DOING THINGS: NOT AT ALL
2. FEELING DOWN, DEPRESSED OR HOPELESS: NOT AT ALL
1. LITTLE INTEREST OR PLEASURE IN DOING THINGS: NOT AT ALL
SUM OF ALL RESPONSES TO PHQ QUESTIONS 1-9: 0

## 2025-07-09 ENCOUNTER — OFFICE VISIT (OUTPATIENT)
Dept: PRIMARY CARE CLINIC | Age: 45
End: 2025-07-09
Payer: COMMERCIAL

## 2025-07-09 VITALS
SYSTOLIC BLOOD PRESSURE: 122 MMHG | WEIGHT: 191 LBS | TEMPERATURE: 98 F | HEART RATE: 83 BPM | BODY MASS INDEX: 35.15 KG/M2 | DIASTOLIC BLOOD PRESSURE: 60 MMHG | OXYGEN SATURATION: 97 % | HEIGHT: 62 IN

## 2025-07-09 DIAGNOSIS — E66.811 CLASS 1 OBESITY WITH BODY MASS INDEX (BMI) OF 34.0 TO 34.9 IN ADULT, UNSPECIFIED OBESITY TYPE, UNSPECIFIED WHETHER SERIOUS COMORBIDITY PRESENT: ICD-10-CM

## 2025-07-09 DIAGNOSIS — E78.2 MIXED HYPERLIPIDEMIA: ICD-10-CM

## 2025-07-09 DIAGNOSIS — F41.9 ANXIETY: ICD-10-CM

## 2025-07-09 DIAGNOSIS — Z00.00 ANNUAL PHYSICAL EXAM: Primary | ICD-10-CM

## 2025-07-09 DIAGNOSIS — R73.9 HYPERGLYCEMIA: ICD-10-CM

## 2025-07-09 PROCEDURE — 99396 PREV VISIT EST AGE 40-64: CPT | Performed by: FAMILY MEDICINE

## 2025-07-09 PROCEDURE — 99214 OFFICE O/P EST MOD 30 MIN: CPT | Performed by: FAMILY MEDICINE

## 2025-07-09 PROCEDURE — 93000 ELECTROCARDIOGRAM COMPLETE: CPT | Performed by: FAMILY MEDICINE

## 2025-07-09 SDOH — ECONOMIC STABILITY: FOOD INSECURITY: WITHIN THE PAST 12 MONTHS, THE FOOD YOU BOUGHT JUST DIDN'T LAST AND YOU DIDN'T HAVE MONEY TO GET MORE.: NEVER TRUE

## 2025-07-09 SDOH — ECONOMIC STABILITY: FOOD INSECURITY: WITHIN THE PAST 12 MONTHS, YOU WORRIED THAT YOUR FOOD WOULD RUN OUT BEFORE YOU GOT MONEY TO BUY MORE.: NEVER TRUE

## 2025-07-09 NOTE — PROGRESS NOTES
Leah Reno : 1980 Sex: female  Age: 44 y.o.    Chief Complaint   Patient presents with    Annual Exam    Health Maintenance    Discuss Labs       HPI:     Presents today for follow-up.  Interested in GLP-1 compounded formula for weight loss.  Overall feels well.  Cholesterol reviewed, lifestyle modification reviewed.  Tried metformin in the past for weight, did not tolerate.      The 10-year ASCVD risk score (Cielo ROSE, et al., 2019) is: 1.4%    Values used to calculate the score:      Age: 44 years      Sex: Female      Is Non- : No      Diabetic: No      Tobacco smoker: No      Systolic Blood Pressure: 122 mmHg      Is BP treated: No      HDL Cholesterol: 37 mg/dL      Total Cholesterol: 197 mg/dL    Health Maintenance:  Proper diet reviewed including Mediterranean and DASH diets. Counseled on healthy weight, appropriate exercise, avoidance of tobacco, and recommendations for minimal to no alcohol consumption.  Counseled on the potential pros and cons of vitamins and supplements.  Reviewed the recommendations and risk/benefits of vaccines including COVID vaccine, declines,   Td/Tdap (),  prevnar 20 (counseled) , flu vaccine (seasonal), Hepatitis vaccines (counseled),  and shingrix (patient had chicken pox). HIV and Hep C (declines)  screening guidelines were reviewed.  Importance of regular eye and dental exams and health reviewed.  Risks/Benefits of ASA reviewed and discussed latest guidelines. Sun protection reviewed. Notify if any new or changing moles/skin lesions, etc. Sees Derm, going to FU. Dexa Scan indications/ risk factors for osteoporotic fractures and prevention reviewed. Colonoscopy recommendations reviewed.  Pt denies change in bowel habits or FMH of colon CA. Dad had polyps, his dr recommended kids be screened after 40. Colonoscopy  neg, planning 7 years, dr glass; Fit deferred    EKG done and  reviewed with pt.  Discussed the indications for repeat

## 2025-08-11 ENCOUNTER — TELEMEDICINE (OUTPATIENT)
Dept: PRIMARY CARE CLINIC | Age: 45
End: 2025-08-11
Payer: COMMERCIAL

## 2025-08-11 DIAGNOSIS — F41.9 ANXIETY: ICD-10-CM

## 2025-08-11 DIAGNOSIS — R73.9 HYPERGLYCEMIA: ICD-10-CM

## 2025-08-11 DIAGNOSIS — E78.2 MIXED HYPERLIPIDEMIA: ICD-10-CM

## 2025-08-11 DIAGNOSIS — E66.811 CLASS 1 OBESITY WITH BODY MASS INDEX (BMI) OF 34.0 TO 34.9 IN ADULT, UNSPECIFIED OBESITY TYPE, UNSPECIFIED WHETHER SERIOUS COMORBIDITY PRESENT: Primary | ICD-10-CM

## 2025-08-11 PROCEDURE — 99214 OFFICE O/P EST MOD 30 MIN: CPT | Performed by: FAMILY MEDICINE

## (undated) DEVICE — GLOVE,SURG,SIGNATURE LTX MICR,LTX,PF,7.0: Brand: MEDLINE

## (undated) DEVICE — SPONGE GZ W4XL4IN RAYON POLY CVR W/NONWOVEN FAB STRL 2/PK

## (undated) DEVICE — GRADUATE TRIANG MEASURE 1000ML BLK PRNT